# Patient Record
Sex: MALE | Race: WHITE | NOT HISPANIC OR LATINO | URBAN - METROPOLITAN AREA
[De-identification: names, ages, dates, MRNs, and addresses within clinical notes are randomized per-mention and may not be internally consistent; named-entity substitution may affect disease eponyms.]

---

## 2023-05-19 ENCOUNTER — OFFICE VISIT (OUTPATIENT)
Dept: PRIMARY CARE | Facility: CLINIC | Age: 83
End: 2023-05-19
Payer: COMMERCIAL

## 2023-05-19 VITALS
HEIGHT: 69 IN | DIASTOLIC BLOOD PRESSURE: 70 MMHG | BODY MASS INDEX: 21.42 KG/M2 | WEIGHT: 144.6 LBS | SYSTOLIC BLOOD PRESSURE: 134 MMHG

## 2023-05-19 DIAGNOSIS — Z00.00 ROUTINE GENERAL MEDICAL EXAMINATION AT A HEALTH CARE FACILITY: ICD-10-CM

## 2023-05-19 DIAGNOSIS — K52.9 GASTROENTERITIS: ICD-10-CM

## 2023-05-19 DIAGNOSIS — Z13.5 DIABETIC RETINOPATHY SCREENING: ICD-10-CM

## 2023-05-19 DIAGNOSIS — A49.9 BACTERIAL INFECTION: Primary | ICD-10-CM

## 2023-05-19 PROBLEM — M54.30 SCIATICA: Status: ACTIVE | Noted: 2023-05-19

## 2023-05-19 PROBLEM — M19.90 OSTEOARTHRITIS: Status: ACTIVE | Noted: 2023-05-19

## 2023-05-19 PROBLEM — E11.59 HYPERTENSION ASSOCIATED WITH DIABETES (MULTI): Status: ACTIVE | Noted: 2023-05-19

## 2023-05-19 PROBLEM — I15.2 HYPERTENSION ASSOCIATED WITH DIABETES (MULTI): Status: ACTIVE | Noted: 2023-05-19

## 2023-05-19 PROBLEM — E11.9 TYPE 2 DIABETES MELLITUS (MULTI): Status: ACTIVE | Noted: 2023-05-19

## 2023-05-19 PROBLEM — E03.9 HYPOTHYROIDISM, ADULT: Status: ACTIVE | Noted: 2023-05-19

## 2023-05-19 PROBLEM — R53.83 LACK OF STAMINA: Status: ACTIVE | Noted: 2023-05-19

## 2023-05-19 LAB — POC HEMOGLOBIN A1C: 8.2 % (ref 4.2–6.5)

## 2023-05-19 PROCEDURE — 99214 OFFICE O/P EST MOD 30 MIN: CPT | Performed by: EMERGENCY MEDICINE

## 2023-05-19 PROCEDURE — 1036F TOBACCO NON-USER: CPT | Performed by: EMERGENCY MEDICINE

## 2023-05-19 PROCEDURE — 3075F SYST BP GE 130 - 139MM HG: CPT | Performed by: EMERGENCY MEDICINE

## 2023-05-19 PROCEDURE — 1159F MED LIST DOCD IN RCRD: CPT | Performed by: EMERGENCY MEDICINE

## 2023-05-19 PROCEDURE — 83036 HEMOGLOBIN GLYCOSYLATED A1C: CPT | Performed by: EMERGENCY MEDICINE

## 2023-05-19 PROCEDURE — 1157F ADVNC CARE PLAN IN RCRD: CPT | Performed by: EMERGENCY MEDICINE

## 2023-05-19 PROCEDURE — 3078F DIAST BP <80 MM HG: CPT | Performed by: EMERGENCY MEDICINE

## 2023-05-19 RX ORDER — CYCLOBENZAPRINE HCL 10 MG
1 TABLET ORAL NIGHTLY
COMMUNITY
Start: 2022-08-15

## 2023-05-19 RX ORDER — METOPROLOL TARTRATE 25 MG/1
25 TABLET, FILM COATED ORAL
Qty: 90 TABLET | Refills: 1 | Status: SHIPPED | OUTPATIENT
Start: 2023-05-19 | End: 2023-08-18 | Stop reason: SDUPTHER

## 2023-05-19 RX ORDER — METRONIDAZOLE 500 MG/1
500 TABLET ORAL 2 TIMES DAILY
Qty: 14 TABLET | Refills: 0 | Status: SHIPPED | OUTPATIENT
Start: 2023-05-19 | End: 2023-05-26

## 2023-05-19 RX ORDER — LISINOPRIL 10 MG/1
1 TABLET ORAL DAILY
COMMUNITY
Start: 2012-12-03 | End: 2023-05-23 | Stop reason: SDUPTHER

## 2023-05-19 RX ORDER — METOPROLOL TARTRATE 25 MG/1
25 TABLET, FILM COATED ORAL
COMMUNITY
Start: 2012-12-03 | End: 2023-05-19 | Stop reason: SDUPTHER

## 2023-05-19 RX ORDER — LANCETS
EACH MISCELLANEOUS
COMMUNITY
Start: 2023-02-14

## 2023-05-19 RX ORDER — CIPROFLOXACIN 500 MG/1
500 TABLET ORAL 2 TIMES DAILY
Qty: 14 TABLET | Refills: 0 | Status: SHIPPED | OUTPATIENT
Start: 2023-05-19 | End: 2023-05-26

## 2023-05-19 RX ORDER — LEVOTHYROXINE SODIUM 25 UG/1
1 TABLET ORAL DAILY
COMMUNITY
Start: 2014-11-06 | End: 2024-03-05 | Stop reason: SDUPTHER

## 2023-05-19 RX ORDER — METFORMIN HYDROCHLORIDE 1000 MG/1
1 TABLET ORAL EVERY 12 HOURS
COMMUNITY
Start: 2012-12-03 | End: 2024-03-05 | Stop reason: SDUPTHER

## 2023-05-19 RX ORDER — LANCETS 30 GAUGE
EACH MISCELLANEOUS
COMMUNITY
Start: 2022-09-06

## 2023-05-19 RX ORDER — BLOOD SUGAR DIAGNOSTIC
STRIP MISCELLANEOUS
COMMUNITY
End: 2023-07-20

## 2023-05-23 DIAGNOSIS — E11.59 HYPERTENSION ASSOCIATED WITH DIABETES (MULTI): ICD-10-CM

## 2023-05-23 DIAGNOSIS — I15.2 HYPERTENSION ASSOCIATED WITH DIABETES (MULTI): ICD-10-CM

## 2023-05-24 RX ORDER — LISINOPRIL 10 MG/1
10 TABLET ORAL DAILY
Qty: 90 TABLET | Refills: 1 | Status: SHIPPED | OUTPATIENT
Start: 2023-05-24 | End: 2023-08-18 | Stop reason: SDUPTHER

## 2023-07-20 DIAGNOSIS — E11.69 TYPE 2 DIABETES MELLITUS WITH OTHER SPECIFIED COMPLICATION, UNSPECIFIED WHETHER LONG TERM INSULIN USE (MULTI): ICD-10-CM

## 2023-07-20 RX ORDER — BLOOD SUGAR DIAGNOSTIC
STRIP MISCELLANEOUS
Qty: 100 STRIP | Refills: 3 | Status: SHIPPED | OUTPATIENT
Start: 2023-07-20

## 2023-07-23 DIAGNOSIS — E11.69 TYPE 2 DIABETES MELLITUS WITH OTHER SPECIFIED COMPLICATION, UNSPECIFIED WHETHER LONG TERM INSULIN USE (MULTI): ICD-10-CM

## 2023-07-26 RX ORDER — LANCETS 33 GAUGE
EACH MISCELLANEOUS
Qty: 100 EACH | Refills: 1 | Status: SHIPPED | OUTPATIENT
Start: 2023-07-26 | End: 2023-07-31 | Stop reason: SDUPTHER

## 2023-07-31 RX ORDER — BLOOD-GLUCOSE CONTROL, NORMAL
EACH MISCELLANEOUS
Qty: 100 EACH | Refills: 1 | Status: SHIPPED | OUTPATIENT
Start: 2023-07-31 | End: 2024-03-05 | Stop reason: SDUPTHER

## 2023-08-18 ENCOUNTER — OFFICE VISIT (OUTPATIENT)
Dept: PRIMARY CARE | Facility: CLINIC | Age: 83
End: 2023-08-18
Payer: COMMERCIAL

## 2023-08-18 VITALS
DIASTOLIC BLOOD PRESSURE: 96 MMHG | HEIGHT: 69 IN | SYSTOLIC BLOOD PRESSURE: 156 MMHG | BODY MASS INDEX: 28.17 KG/M2 | WEIGHT: 190.2 LBS

## 2023-08-18 DIAGNOSIS — I15.2 HYPERTENSION ASSOCIATED WITH DIABETES (MULTI): ICD-10-CM

## 2023-08-18 DIAGNOSIS — E05.90 HYPERTHYROIDISM: ICD-10-CM

## 2023-08-18 DIAGNOSIS — E11.59 HYPERTENSION ASSOCIATED WITH DIABETES (MULTI): ICD-10-CM

## 2023-08-18 DIAGNOSIS — Z00.00 ROUTINE GENERAL MEDICAL EXAMINATION AT A HEALTH CARE FACILITY: ICD-10-CM

## 2023-08-18 DIAGNOSIS — E11.69 TYPE 2 DIABETES MELLITUS WITH OTHER SPECIFIED COMPLICATION, UNSPECIFIED WHETHER LONG TERM INSULIN USE (MULTI): ICD-10-CM

## 2023-08-18 DIAGNOSIS — M19.90 OSTEOARTHRITIS, UNSPECIFIED OSTEOARTHRITIS TYPE, UNSPECIFIED SITE: ICD-10-CM

## 2023-08-18 DIAGNOSIS — I15.9 SECONDARY HYPERTENSION: ICD-10-CM

## 2023-08-18 DIAGNOSIS — E03.9 HYPOTHYROIDISM, ADULT: Primary | ICD-10-CM

## 2023-08-18 LAB
NON-UH HIE A/G RATIO: 1
NON-UH HIE ALB: 3.6 G/DL (ref 3.4–5)
NON-UH HIE ALK PHOS: 84 UNIT/L (ref 46–116)
NON-UH HIE BASO COUNT: 0.04 X1000 (ref 0–0.2)
NON-UH HIE BASOS %: 0.6 %
NON-UH HIE BILIRUBIN, TOTAL: 0.7 MG/DL (ref 0.2–1)
NON-UH HIE BUN/CREAT RATIO: 19.2
NON-UH HIE BUN: 23 MG/DL (ref 9–23)
NON-UH HIE CALCIUM: 9.4 MG/DL (ref 8.7–10.4)
NON-UH HIE CALCULATED LDL CHOLESTEROL: 92 MG/DL (ref 60–130)
NON-UH HIE CALCULATED OSMOLALITY: 288 MOSM/KG (ref 275–295)
NON-UH HIE CHLORIDE: 104 MMOL/L (ref 98–107)
NON-UH HIE CHOLESTEROL: 159 MG/DL (ref 100–200)
NON-UH HIE CO2, VENOUS: 27 MMOL/L (ref 20–31)
NON-UH HIE CREATININE: 1.2 MG/DL (ref 0.6–1.1)
NON-UH HIE DIFF?: NO
NON-UH HIE EOS COUNT: 0.16 X1000 (ref 0–0.5)
NON-UH HIE EOSIN %: 2.4 %
NON-UH HIE GFR AA: >60
NON-UH HIE GLOBULIN: 3.5 G/DL
NON-UH HIE GLOMERULAR FILTRATION RATE: 58 ML/MIN/1.73M?
NON-UH HIE GLUCOSE: 190 MG/DL (ref 74–106)
NON-UH HIE GOT: 21 UNIT/L (ref 15–37)
NON-UH HIE GPT: 24 UNIT/L (ref 10–49)
NON-UH HIE HCT: 45.5 % (ref 41–52)
NON-UH HIE HDL CHOLESTEROL: 38 MG/DL (ref 40–60)
NON-UH HIE HGB A1C: 7.6 %
NON-UH HIE HGB: 15.1 G/DL (ref 13.5–17.5)
NON-UH HIE INSTR WBC: 6.4
NON-UH HIE K: 4.5 MMOL/L (ref 3.5–5.1)
NON-UH HIE LYMPH %: 24 %
NON-UH HIE LYMPH COUNT: 1.53 X1000 (ref 1.2–4.8)
NON-UH HIE MCH: 29.7 PG (ref 27–34)
NON-UH HIE MCHC: 33.2 G/DL (ref 32–37)
NON-UH HIE MCV: 89.5 FL (ref 80–100)
NON-UH HIE MONO %: 11.1 %
NON-UH HIE MONO COUNT: 0.71 X1000 (ref 0.1–1)
NON-UH HIE MPV: 8.4 FL (ref 7.4–10.4)
NON-UH HIE NA: 140 MMOL/L (ref 135–145)
NON-UH HIE NEUTROPHIL %: 61.9 %
NON-UH HIE NEUTROPHIL COUNT (ANC): 3.95 X1000 (ref 1.4–8.8)
NON-UH HIE NUCLEATED RBC: 0 /100WBC
NON-UH HIE PLATELET: 171 X10 (ref 150–450)
NON-UH HIE RBC: 5.09 X10 (ref 4.7–6.1)
NON-UH HIE RDW: 13.9 % (ref 11.5–14.5)
NON-UH HIE TOTAL CHOL/HDL CHOL RATIO: 4.2
NON-UH HIE TOTAL PROTEIN: 7.1 G/DL (ref 5.7–8.2)
NON-UH HIE TRIGLYCERIDES: 147 MG/DL (ref 30–150)
NON-UH HIE TSH: 4.18 UIU/ML (ref 0.55–4.78)
NON-UH HIE WBC: 6.4 X10 (ref 4.5–11)

## 2023-08-18 PROCEDURE — 3077F SYST BP >= 140 MM HG: CPT | Performed by: EMERGENCY MEDICINE

## 2023-08-18 PROCEDURE — 1157F ADVNC CARE PLAN IN RCRD: CPT | Performed by: EMERGENCY MEDICINE

## 2023-08-18 PROCEDURE — 1036F TOBACCO NON-USER: CPT | Performed by: EMERGENCY MEDICINE

## 2023-08-18 PROCEDURE — 3080F DIAST BP >= 90 MM HG: CPT | Performed by: EMERGENCY MEDICINE

## 2023-08-18 PROCEDURE — 99214 OFFICE O/P EST MOD 30 MIN: CPT | Performed by: EMERGENCY MEDICINE

## 2023-08-18 PROCEDURE — 1159F MED LIST DOCD IN RCRD: CPT | Performed by: EMERGENCY MEDICINE

## 2023-08-18 RX ORDER — LISINOPRIL 10 MG/1
10 TABLET ORAL DAILY
Qty: 90 TABLET | Refills: 1 | Status: SHIPPED | OUTPATIENT
Start: 2023-08-18 | End: 2024-03-05 | Stop reason: SDUPTHER

## 2023-08-18 RX ORDER — AMLODIPINE BESYLATE 5 MG/1
5 TABLET ORAL DAILY
Qty: 90 TABLET | Refills: 1 | Status: SHIPPED | OUTPATIENT
Start: 2023-08-18

## 2023-08-18 RX ORDER — LEVOTHYROXINE SODIUM 25 UG/1
25 TABLET ORAL
Qty: 90 TABLET | Refills: 1 | Status: SHIPPED | OUTPATIENT
Start: 2023-08-18

## 2023-08-18 RX ORDER — LEVOTHYROXINE SODIUM 25 UG/1
25 TABLET ORAL
COMMUNITY
End: 2023-08-18 | Stop reason: SDUPTHER

## 2023-08-18 RX ORDER — METOPROLOL TARTRATE 25 MG/1
50 TABLET, FILM COATED ORAL
Qty: 180 TABLET | Refills: 1 | Status: SHIPPED | OUTPATIENT
Start: 2023-08-18 | End: 2024-03-05 | Stop reason: SDUPTHER

## 2023-08-18 RX ORDER — AMLODIPINE BESYLATE 5 MG/1
TABLET ORAL DAILY
COMMUNITY
End: 2023-08-18 | Stop reason: SDUPTHER

## 2023-08-18 NOTE — PROGRESS NOTES
Subjective   Patient ID: Teddy Todd is a 83 y.o. male who presents for Follow-up.    Assessment/Plan   Problem List Items Addressed This Visit    None  82-year-old here for  follow up, last Medicare wellness in February 23     Patient is unsure as to what medications he takes    Diarrhea-improved with discontinuation of metformin and addition of dietary fiber    left shoulder pain- improved with physical therapy      Diabetes- on metformin and glipizide. Recently elevated home sugar numbers, check A1c.      Hypertension-patient unsure of what he takes.  His paperwork from [facility] is different medications than our computer .  Control is suboptimal.  Counseled the patient to call us with his accurate medication list and bring it with him next time    hyperlipidemia -simvastatin. Check lipid panel     Hypothyroidism- levothyroxine. Check TSH     Lab work    Source of history: Nurse, Medical personnel, Medical record, Patient.  History limitation: None.    HPI    No Known Allergies  This is a 82-year-old man here for  follow up     Recently went to emergency room for URI-like symptoms.  Test were negative.  He was discharged home on some over-the-counter medications.  Improved        Past medical history includes T2 DM, metabolic syndrome, hypertension, hyperlipidemia, hypothyroidism, hearing impairment, UTI, back pain with left lower extremity radiculopathy, and bilateral shoulder discomfort.     Social history  Drinks 5-6 drinks a year. Denies smoking or drugs     Family history is noncontributory   Current Outpatient Medications   Medication Sig Dispense Refill    metFORMIN (Glucophage) 1,000 mg tablet Take 1 tablet (1,000 mg) by mouth every 12 hours.      cyclobenzaprine (Flexeril) 10 mg tablet Take 1 tablet (10 mg) by mouth once daily at bedtime.      glipiZIDE (GlucotroL) 5 mg tablet Take 1 tablet daily 90 tablet 1    lancets (OneTouch Delica Plus Lancet) 30 gauge misc Test once daily 100 each 1  "   levothyroxine (Synthroid, Levoxyl) 25 mcg tablet Take 1 tablet (25 mcg) by mouth once daily.      lisinopril 10 mg tablet Take 1 tablet (10 mg) by mouth once daily. 90 tablet 1    metoprolol tartrate (Lopressor) 25 mg tablet Take 1 tablet (25 mg) by mouth once daily. 90 tablet 1    OneTouch Ultra Test strip TEST ONCE DAILY 100 strip 3    OneTouch Ultra2 Meter misc TEST ONCE A DAY AS DIRECTED      OneTouch UltraSoft Lancets misc TEST BLOOD SUGAR ONCE A DAY       No current facility-administered medications for this visit.       Objective   Visit Vitals  BP (!) 156/96   Ht 1.753 m (5' 9\")   Wt 86.3 kg (190 lb 3.2 oz)   BMI 28.09 kg/m²   Smoking Status Never   BSA 2.05 m²     Physical Exam  Vital signs as per nursing/MA documentation  General appearance: Alert and in no acute distress  HEENT: Normal Inspection  Neck - Normal Inspection  Respiratory : No respiratory distress. Lungs are clear   Cardiovascular: heart rate normal. No gallop  Back - normal inspection  Skin inspection:Warm  Musculoskeletal : No deformities  Neuro : Limited exam. Baseline    Review of Systems   Comprehensive review of systems as allowed by patient condition and nursing input is negative    Results including lab work, imaging reports were reviewed and wherever possible, independently verified    No family history on file.  Social History     Socioeconomic History    Marital status:      Spouse name: Not on file    Number of children: Not on file    Years of education: Not on file    Highest education level: Not on file   Occupational History    Not on file   Tobacco Use    Smoking status: Never    Smokeless tobacco: Never   Substance and Sexual Activity    Alcohol use: Not Currently    Drug use: Not on file    Sexual activity: Not on file   Other Topics Concern    Not on file   Social History Narrative    Not on file     Social Determinants of Health     Financial Resource Strain: Not on file   Food Insecurity: Not on file "   Transportation Needs: Not on file   Physical Activity: Not on file   Stress: Not on file   Social Connections: Not on file   Intimate Partner Violence: Not on file   Housing Stability: Not on file     Past Medical History:   Diagnosis Date    Hyperlipidemia, unspecified 04/30/2021    Hyperlipidemia    Metabolic syndrome 03/06/2018    Dysmetabolic syndrome    Personal history of other diseases of the circulatory system     History of hypertension    Personal history of other endocrine, nutritional and metabolic disease 11/06/2014    History of hypothyroidism    Personal history of urinary (tract) infections 11/05/2013    Personal history of urinary tract infection    Type 2 diabetes mellitus without complications (CMS/[facility]) 08/15/2022    Type 2 diabetes mellitus     No past surgical history on file.    Charting was completed using voice recognition technology and may include unintended errors.

## 2023-08-22 DIAGNOSIS — E11.69 TYPE 2 DIABETES MELLITUS WITH OTHER SPECIFIED COMPLICATION, UNSPECIFIED WHETHER LONG TERM INSULIN USE (MULTI): ICD-10-CM

## 2023-08-22 DIAGNOSIS — E78.5 HYPERLIPIDEMIA, UNSPECIFIED HYPERLIPIDEMIA TYPE: ICD-10-CM

## 2023-08-23 RX ORDER — GLIPIZIDE 5 MG/1
TABLET ORAL
Qty: 90 TABLET | Refills: 1 | Status: SHIPPED | OUTPATIENT
Start: 2023-08-23 | End: 2024-02-08

## 2023-08-23 RX ORDER — SIMVASTATIN 20 MG/1
20 TABLET, FILM COATED ORAL DAILY
Qty: 90 TABLET | Refills: 1 | Status: SHIPPED | OUTPATIENT
Start: 2023-08-23 | End: 2024-02-08

## 2024-02-07 DIAGNOSIS — E78.5 HYPERLIPIDEMIA, UNSPECIFIED HYPERLIPIDEMIA TYPE: ICD-10-CM

## 2024-02-07 DIAGNOSIS — E11.69 TYPE 2 DIABETES MELLITUS WITH OTHER SPECIFIED COMPLICATION, UNSPECIFIED WHETHER LONG TERM INSULIN USE (MULTI): ICD-10-CM

## 2024-02-08 RX ORDER — GLIPIZIDE 5 MG/1
TABLET ORAL
Qty: 90 TABLET | Refills: 1 | Status: SHIPPED | OUTPATIENT
Start: 2024-02-08 | End: 2024-03-05 | Stop reason: SDUPTHER

## 2024-02-08 RX ORDER — SIMVASTATIN 20 MG/1
20 TABLET, FILM COATED ORAL DAILY
Qty: 90 TABLET | Refills: 1 | Status: SHIPPED | OUTPATIENT
Start: 2024-02-08

## 2024-03-05 DIAGNOSIS — E11.69 TYPE 2 DIABETES MELLITUS WITH OTHER SPECIFIED COMPLICATION, UNSPECIFIED WHETHER LONG TERM INSULIN USE (MULTI): ICD-10-CM

## 2024-03-05 DIAGNOSIS — I15.2 HYPERTENSION ASSOCIATED WITH DIABETES (MULTI): ICD-10-CM

## 2024-03-05 DIAGNOSIS — E11.59 HYPERTENSION ASSOCIATED WITH DIABETES (MULTI): ICD-10-CM

## 2024-03-05 DIAGNOSIS — Z00.00 ROUTINE GENERAL MEDICAL EXAMINATION AT A HEALTH CARE FACILITY: ICD-10-CM

## 2024-03-05 RX ORDER — METFORMIN HYDROCHLORIDE 1000 MG/1
1000 TABLET ORAL EVERY 12 HOURS
Qty: 180 TABLET | Refills: 1 | Status: SHIPPED | OUTPATIENT
Start: 2024-03-05

## 2024-03-05 RX ORDER — GLIPIZIDE 5 MG/1
5 TABLET ORAL DAILY
Qty: 90 TABLET | Refills: 1 | Status: SHIPPED | OUTPATIENT
Start: 2024-03-05 | End: 2024-04-24 | Stop reason: SDUPTHER

## 2024-03-05 RX ORDER — BLOOD-GLUCOSE CONTROL, NORMAL
EACH MISCELLANEOUS
Qty: 100 EACH | Refills: 1 | Status: SHIPPED | OUTPATIENT
Start: 2024-03-05

## 2024-03-05 RX ORDER — LISINOPRIL 10 MG/1
10 TABLET ORAL DAILY
Qty: 90 TABLET | Refills: 1 | Status: SHIPPED | OUTPATIENT
Start: 2024-03-05

## 2024-03-05 RX ORDER — LEVOTHYROXINE SODIUM 25 UG/1
25 TABLET ORAL DAILY
Qty: 90 TABLET | Refills: 1 | Status: SHIPPED | OUTPATIENT
Start: 2024-03-05

## 2024-03-05 RX ORDER — METOPROLOL TARTRATE 25 MG/1
50 TABLET, FILM COATED ORAL
Qty: 180 TABLET | Refills: 1 | Status: SHIPPED | OUTPATIENT
Start: 2024-03-05

## 2024-04-24 ENCOUNTER — LAB (OUTPATIENT)
Dept: LAB | Facility: LAB | Age: 84
End: 2024-04-24
Payer: COMMERCIAL

## 2024-04-24 ENCOUNTER — OFFICE VISIT (OUTPATIENT)
Dept: PRIMARY CARE | Facility: CLINIC | Age: 84
End: 2024-04-24
Payer: COMMERCIAL

## 2024-04-24 VITALS
OXYGEN SATURATION: 96 % | WEIGHT: 193.2 LBS | SYSTOLIC BLOOD PRESSURE: 167 MMHG | BODY MASS INDEX: 28.61 KG/M2 | HEART RATE: 67 BPM | DIASTOLIC BLOOD PRESSURE: 89 MMHG | HEIGHT: 69 IN

## 2024-04-24 DIAGNOSIS — E03.9 HYPOTHYROIDISM, UNSPECIFIED TYPE: ICD-10-CM

## 2024-04-24 DIAGNOSIS — E11.59 HYPERTENSION ASSOCIATED WITH DIABETES (MULTI): Primary | ICD-10-CM

## 2024-04-24 DIAGNOSIS — E13.69 OTHER SPECIFIED DIABETES MELLITUS WITH OTHER SPECIFIED COMPLICATION, UNSPECIFIED WHETHER LONG TERM INSULIN USE (MULTI): ICD-10-CM

## 2024-04-24 DIAGNOSIS — D64.9 ANEMIA, UNSPECIFIED TYPE: ICD-10-CM

## 2024-04-24 DIAGNOSIS — T14.8XXA MUSCLE STRAIN: ICD-10-CM

## 2024-04-24 DIAGNOSIS — E78.5 DYSLIPIDEMIA: ICD-10-CM

## 2024-04-24 DIAGNOSIS — Z00.00 ROUTINE GENERAL MEDICAL EXAMINATION AT A HEALTH CARE FACILITY: ICD-10-CM

## 2024-04-24 DIAGNOSIS — E11.69 TYPE 2 DIABETES MELLITUS WITH OTHER SPECIFIED COMPLICATION, UNSPECIFIED WHETHER LONG TERM INSULIN USE (MULTI): ICD-10-CM

## 2024-04-24 DIAGNOSIS — I15.2 HYPERTENSION ASSOCIATED WITH DIABETES (MULTI): Primary | ICD-10-CM

## 2024-04-24 DIAGNOSIS — E03.9 HYPOTHYROIDISM, ADULT: ICD-10-CM

## 2024-04-24 LAB
ALBUMIN SERPL BCP-MCNC: 4.5 G/DL (ref 3.4–5)
ALP SERPL-CCNC: 64 U/L (ref 33–136)
ALT SERPL W P-5'-P-CCNC: 21 U/L (ref 10–52)
ANION GAP SERPL CALC-SCNC: 12 MMOL/L (ref 10–20)
AST SERPL W P-5'-P-CCNC: 22 U/L (ref 9–39)
BASOPHILS # BLD AUTO: 0.08 X10*3/UL (ref 0–0.1)
BASOPHILS NFR BLD AUTO: 1.2 %
BILIRUB SERPL-MCNC: 0.6 MG/DL (ref 0–1.2)
BUN SERPL-MCNC: 16 MG/DL (ref 6–23)
CALCIUM SERPL-MCNC: 9.8 MG/DL (ref 8.6–10.6)
CHLORIDE SERPL-SCNC: 103 MMOL/L (ref 98–107)
CHOLEST SERPL-MCNC: 185 MG/DL (ref 0–199)
CHOLESTEROL/HDL RATIO: 4.6
CO2 SERPL-SCNC: 29 MMOL/L (ref 21–32)
CREAT SERPL-MCNC: 1.08 MG/DL (ref 0.5–1.3)
EGFRCR SERPLBLD CKD-EPI 2021: 68 ML/MIN/1.73M*2
EOSINOPHIL # BLD AUTO: 0.14 X10*3/UL (ref 0–0.4)
EOSINOPHIL NFR BLD AUTO: 2.2 %
ERYTHROCYTE [DISTWIDTH] IN BLOOD BY AUTOMATED COUNT: 13.2 % (ref 11.5–14.5)
GLUCOSE SERPL-MCNC: 133 MG/DL (ref 74–99)
HCT VFR BLD AUTO: 48.9 % (ref 41–52)
HDLC SERPL-MCNC: 40.5 MG/DL
HGB BLD-MCNC: 15.7 G/DL (ref 13.5–17.5)
IMM GRANULOCYTES # BLD AUTO: 0.04 X10*3/UL (ref 0–0.5)
IMM GRANULOCYTES NFR BLD AUTO: 0.6 % (ref 0–0.9)
LDLC SERPL CALC-MCNC: 97 MG/DL
LYMPHOCYTES # BLD AUTO: 2.32 X10*3/UL (ref 0.8–3)
LYMPHOCYTES NFR BLD AUTO: 35.6 %
MCH RBC QN AUTO: 29.3 PG (ref 26–34)
MCHC RBC AUTO-ENTMCNC: 32.1 G/DL (ref 32–36)
MCV RBC AUTO: 91 FL (ref 80–100)
MONOCYTES # BLD AUTO: 0.61 X10*3/UL (ref 0.05–0.8)
MONOCYTES NFR BLD AUTO: 9.4 %
NEUTROPHILS # BLD AUTO: 3.32 X10*3/UL (ref 1.6–5.5)
NEUTROPHILS NFR BLD AUTO: 51 %
NON HDL CHOLESTEROL: 145 MG/DL (ref 0–149)
NRBC BLD-RTO: 0 /100 WBCS (ref 0–0)
PLATELET # BLD AUTO: 199 X10*3/UL (ref 150–450)
POC HEMOGLOBIN A1C: 8.5 % (ref 4.2–6.5)
POTASSIUM SERPL-SCNC: 5.1 MMOL/L (ref 3.5–5.3)
PROT SERPL-MCNC: 7.5 G/DL (ref 6.4–8.2)
RBC # BLD AUTO: 5.35 X10*6/UL (ref 4.5–5.9)
SODIUM SERPL-SCNC: 139 MMOL/L (ref 136–145)
TRIGL SERPL-MCNC: 238 MG/DL (ref 0–149)
TSH SERPL-ACNC: 3.52 MIU/L (ref 0.44–3.98)
VLDL: 48 MG/DL (ref 0–40)
WBC # BLD AUTO: 6.5 X10*3/UL (ref 4.4–11.3)

## 2024-04-24 PROCEDURE — 1036F TOBACCO NON-USER: CPT | Performed by: EMERGENCY MEDICINE

## 2024-04-24 PROCEDURE — 85025 COMPLETE CBC W/AUTO DIFF WBC: CPT

## 2024-04-24 PROCEDURE — 99397 PER PM REEVAL EST PAT 65+ YR: CPT | Performed by: EMERGENCY MEDICINE

## 2024-04-24 PROCEDURE — 84443 ASSAY THYROID STIM HORMONE: CPT

## 2024-04-24 PROCEDURE — G0442 ANNUAL ALCOHOL SCREEN 15 MIN: HCPCS | Performed by: EMERGENCY MEDICINE

## 2024-04-24 PROCEDURE — 80061 LIPID PANEL: CPT

## 2024-04-24 PROCEDURE — 83036 HEMOGLOBIN GLYCOSYLATED A1C: CPT | Performed by: EMERGENCY MEDICINE

## 2024-04-24 PROCEDURE — 99213 OFFICE O/P EST LOW 20 MIN: CPT | Performed by: EMERGENCY MEDICINE

## 2024-04-24 PROCEDURE — 3079F DIAST BP 80-89 MM HG: CPT | Performed by: EMERGENCY MEDICINE

## 2024-04-24 PROCEDURE — 80053 COMPREHEN METABOLIC PANEL: CPT

## 2024-04-24 PROCEDURE — G0446 INTENS BEHAVE THER CARDIO DX: HCPCS | Performed by: EMERGENCY MEDICINE

## 2024-04-24 PROCEDURE — 36415 COLL VENOUS BLD VENIPUNCTURE: CPT

## 2024-04-24 PROCEDURE — G0439 PPPS, SUBSEQ VISIT: HCPCS | Performed by: EMERGENCY MEDICINE

## 2024-04-24 PROCEDURE — 1159F MED LIST DOCD IN RCRD: CPT | Performed by: EMERGENCY MEDICINE

## 2024-04-24 PROCEDURE — 1170F FXNL STATUS ASSESSED: CPT | Performed by: EMERGENCY MEDICINE

## 2024-04-24 PROCEDURE — 1157F ADVNC CARE PLAN IN RCRD: CPT | Performed by: EMERGENCY MEDICINE

## 2024-04-24 PROCEDURE — G0444 DEPRESSION SCREEN ANNUAL: HCPCS | Performed by: EMERGENCY MEDICINE

## 2024-04-24 PROCEDURE — 99497 ADVNCD CARE PLAN 30 MIN: CPT | Performed by: EMERGENCY MEDICINE

## 2024-04-24 PROCEDURE — 3077F SYST BP >= 140 MM HG: CPT | Performed by: EMERGENCY MEDICINE

## 2024-04-24 RX ORDER — CYCLOBENZAPRINE HCL 10 MG
10 TABLET ORAL NIGHTLY PRN
Qty: 30 TABLET | Refills: 0 | Status: SHIPPED | OUTPATIENT
Start: 2024-04-24

## 2024-04-24 RX ORDER — GLIPIZIDE 5 MG/1
5 TABLET ORAL DAILY
Qty: 90 TABLET | Refills: 1 | Status: SHIPPED | OUTPATIENT
Start: 2024-04-24

## 2024-04-24 RX ORDER — LISINOPRIL 20 MG/1
20 TABLET ORAL DAILY
Qty: 90 TABLET | Refills: 1 | Status: SHIPPED | OUTPATIENT
Start: 2024-04-24 | End: 2025-05-29

## 2024-04-24 ASSESSMENT — ACTIVITIES OF DAILY LIVING (ADL)
DRESSING: INDEPENDENT
BATHING: INDEPENDENT
TAKING_MEDICATION: INDEPENDENT
GROCERY_SHOPPING: INDEPENDENT
MANAGING_FINANCES: INDEPENDENT
DOING_HOUSEWORK: INDEPENDENT

## 2024-04-24 ASSESSMENT — PATIENT HEALTH QUESTIONNAIRE - PHQ9
SUM OF ALL RESPONSES TO PHQ9 QUESTIONS 1 AND 2: 0
1. LITTLE INTEREST OR PLEASURE IN DOING THINGS: NOT AT ALL
2. FEELING DOWN, DEPRESSED OR HOPELESS: NOT AT ALL

## 2024-04-24 NOTE — PROGRESS NOTES
"Subjective   Reason for Visit: Teddy Todd is an 84 y.o. male here for a Medicare Wellness visit.     Past Medical, Surgical, and Family History reviewed and updated in chart.    Reviewed all medications by prescribing practitioner or clinical pharmacist (such as prescriptions, OTCs, herbal therapies and supplements) and documented in the medical record.    HPI  84 year old male here for Medicare wellness, physical, and follow up visit   Presents for long term health maintenance and medication management      Pain in low back. Worse at nighttime. Able to function throughout day. Very bothersome at night.     Also not genital discomfort. States that he had been able to retract foreskin. Notes similar issue many years ago which prompted him to have circumcision. No issues urinating.     Past medical history includes T2 DM, metabolic syndrome, hypertension, hyperlipidemia, hypothyroidism, hearing impairment, UTI, back pain with left lower extremity radiculopathy, and bilateral shoulder discomfort.     Social history  Drinks 5-6 drinks a year. Denies smoking or drugs     Family history is noncontributory     Patient Care Team:  Spencer Figueredo MD as PCP - General (Internal Medicine)  Spencer Figueredo MD as PCP - [facility] PCP     Review of Systems  Comprehensive review of symptoms was not positive for any symptoms other than HPI.      Objective   Vitals:  /89   Pulse 67   Ht 1.753 m (5' 9\")   Wt 87.6 kg (193 lb 3.2 oz)   SpO2 96%   BMI 28.53 kg/m²       Physical Exam  General appearance: Alert and in no acute distress  HEENT: Normal Inspection.  Neck: Normal Inspectiopn  Respiratory: No respiratory distress.   Cardiovascular: heart rate normal. No gallop  Back: normal inspection  Skin inspection: Warm  Musculoskeletal: No deformities  Neuro: Grossly Intact  Psychiatric: Cooperative     Assessment/Plan   Problem List Items Addressed This Visit       Type 2 diabetes mellitus (Multi)    " Hypothyroidism, adult    Hypertension associated with diabetes (Multi) - Primary     Other Visit Diagnoses       Other specified diabetes mellitus with other specified complication, unspecified whether long term insulin use (Multi)        Relevant Orders    POCT glycosylated hemoglobin (Hb A1C) manually resulted (Completed)    Muscle strain              Flank pain- CT abdomen/pelvis  Flexeril prn at bedtime     Genital discomfort- referred to urology      Diabetes- on metformin and glipizide. A1c in office 8.5, will increase glipizide to 5 mg BID     Hypertension- BP elevated. Continue amlodipine 5 mg and metoprolol 50 mg daily, will increase lisinopril to 20 mg     left shoulder pain- improved with physical therapy      hyperlipidemia -simvastatin. Check lipid panel     Hypothyroidism- levothyroxine. Check TSH     Lab work    Follow up in 3 months or sooner as needed     PH Q-9 depression screening was completed by authorized employee of the practice and answer of the questionnaire were explained and discussed with the patient.    Face-to-face with discussion completed with this individual regarding their cardiovascular risk and behavioral therapies of nutritional choices, exercise and elimination of habits contradicting to the risk. We agreed on how they may be able to reduce their current cardiovascular risk.     Screening for alcohol use completed.      I discussed advanced care planning including the explanation and discussion of advanced directives. If patient does not have current up to date documents, examples and information provided on how to create both living will and power of . Patient was encouraged to work on completing these documents.  Information and advise was also provided on DO NOT RESUSCITATE and patient encouraged to consider this  Patient is not sure about DNR at this time.     Scribe Attestation  By signing my name below, I, Jina Call, [employer]   attest that this documentation has  been prepared under the direction and in the presence of Spencer Figueredo MD.

## 2024-05-13 ENCOUNTER — TELEPHONE (OUTPATIENT)
Dept: PRIMARY CARE | Facility: CLINIC | Age: 84
End: 2024-05-13
Payer: COMMERCIAL

## 2024-05-13 NOTE — TELEPHONE ENCOUNTER
----- Message from Spencer Figueredo MD sent at 5/13/2024  9:31 AM EDT -----  Can you let the patient know that his CT scan does not show any serious abnormalities.  He is significantly constipated.  He needs to take over-the-counter laxatives

## 2024-06-13 ENCOUNTER — OFFICE VISIT (OUTPATIENT)
Dept: PRIMARY CARE | Facility: CLINIC | Age: 84
End: 2024-06-13
Payer: COMMERCIAL

## 2024-06-13 VITALS
OXYGEN SATURATION: 97 % | TEMPERATURE: 98 F | HEIGHT: 69 IN | WEIGHT: 194 LBS | HEART RATE: 94 BPM | BODY MASS INDEX: 28.73 KG/M2 | DIASTOLIC BLOOD PRESSURE: 68 MMHG | SYSTOLIC BLOOD PRESSURE: 140 MMHG

## 2024-06-13 DIAGNOSIS — Z98.890 HX OF CIRCUMCISION: ICD-10-CM

## 2024-06-13 DIAGNOSIS — L28.2 PRURITIC RASH: ICD-10-CM

## 2024-06-13 DIAGNOSIS — L50.9 URTICARIA: Primary | ICD-10-CM

## 2024-06-13 DIAGNOSIS — T78.40XA ALLERGIC REACTION, INITIAL ENCOUNTER: ICD-10-CM

## 2024-06-13 PROCEDURE — 1036F TOBACCO NON-USER: CPT | Performed by: STUDENT IN AN ORGANIZED HEALTH CARE EDUCATION/TRAINING PROGRAM

## 2024-06-13 PROCEDURE — 99214 OFFICE O/P EST MOD 30 MIN: CPT | Performed by: STUDENT IN AN ORGANIZED HEALTH CARE EDUCATION/TRAINING PROGRAM

## 2024-06-13 PROCEDURE — 1157F ADVNC CARE PLAN IN RCRD: CPT | Performed by: STUDENT IN AN ORGANIZED HEALTH CARE EDUCATION/TRAINING PROGRAM

## 2024-06-13 PROCEDURE — 1160F RVW MEDS BY RX/DR IN RCRD: CPT | Performed by: STUDENT IN AN ORGANIZED HEALTH CARE EDUCATION/TRAINING PROGRAM

## 2024-06-13 PROCEDURE — 3078F DIAST BP <80 MM HG: CPT | Performed by: STUDENT IN AN ORGANIZED HEALTH CARE EDUCATION/TRAINING PROGRAM

## 2024-06-13 PROCEDURE — 1159F MED LIST DOCD IN RCRD: CPT | Performed by: STUDENT IN AN ORGANIZED HEALTH CARE EDUCATION/TRAINING PROGRAM

## 2024-06-13 PROCEDURE — 3077F SYST BP >= 140 MM HG: CPT | Performed by: STUDENT IN AN ORGANIZED HEALTH CARE EDUCATION/TRAINING PROGRAM

## 2024-06-13 RX ORDER — CETIRIZINE HYDROCHLORIDE 10 MG/1
10 TABLET ORAL DAILY
Qty: 30 TABLET | Refills: 2 | Status: SHIPPED | OUTPATIENT
Start: 2024-06-13 | End: 2024-09-11

## 2024-06-13 RX ORDER — CIPROFLOXACIN 500 MG/1
1 TABLET ORAL
COMMUNITY
Start: 2024-05-31 | End: 2024-06-13 | Stop reason: WASHOUT

## 2024-06-13 RX ORDER — METHYLPREDNISOLONE 4 MG/1
TABLET ORAL
Qty: 21 TABLET | Refills: 0 | Status: SHIPPED | OUTPATIENT
Start: 2024-06-13 | End: 2024-06-20

## 2024-06-13 RX ORDER — OXYCODONE AND ACETAMINOPHEN 5; 325 MG/1; MG/1
1 TABLET ORAL
COMMUNITY
Start: 2024-05-31

## 2024-06-13 NOTE — PROGRESS NOTES
"Subjective   Patient ID: Teddy Todd is a 84 y.o. male who presents for the following    Assessment/Plan   #Hives  #Urticaria  -Cipro added to medication allergy list     PLAN  -Medrol dose pack. Will not use higher dose steroids due to uncontrolled DM  -Advised to check BG levels at least 2 times daily   -Advised to call PCP if sugars worsen   -Zyrtec rx given  -Advised to use aloe vera on pruritic areas   -Return precautions given       HPI  84M presents for acute sick visit. He had a circumcision 2 week ago and was put on ciprofloxacin. He states that after taking cipro he developed hives over his body. Areas include L thigh, L abdomen, R chest wall. He stopped taking Cipro 3-4 days ago. Currently denies any fevers, chills, CP, SOB, lip swelling, tongue swelling.     Of note patient has a hx of DM and is typically hyperglycemic per patient. Is currently on metformin and glipizide BID. Does not check his BG at home.     Denies any other complaints or symptoms at this time.     Denies fevers, chills, weight loss, lightheadedness, dizziness, vision changes, sore throat, runny nose, CP, SOB, cough, palpitations, n/v/d, abd pain, black/bloody stools, arthralgias, mood disturbance, or new numbness/weakness/tingling in arms/legs/face.      Visit Vitals  /68   Pulse 94   Temp 36.7 °C (98 °F)   Ht 1.753 m (5' 9\")   Wt 88 kg (194 lb)   SpO2 97%   BMI 28.65 kg/m²   Smoking Status Never   BSA 2.07 m²     PHYSICAL EXAM   Physical Exam     Visit Vitals  /68   Pulse 94   Temp 36.7 °C (98 °F)   Ht 1.753 m (5' 9\")   Wt 88 kg (194 lb)   SpO2 97%   BMI 28.65 kg/m²   Smoking Status Never   BSA 2.07 m²        General: NAD. NCAT. Aox3   HEENT: PERRLA. EOMI. MMM.   Cardiovascular: RRR.   Respiratory: CTABL. No acute respiratory distress.   GI: Soft, NT abdomen. S/p circumcision.   MSK: ROM x 4. CTLS non-tender.   Extremities: No edema.   Skin: Pruritic urticaria L thigh, L abdomen, R chest wall.  Neuro: Aox3. Cranial " Nerves grossly intact. Motor/sensory wnl.   Psych: Mood wnl.       REVIEW OF SYSTEMS   ROS in HPI     No Known Allergies    Current Outpatient Medications   Medication Sig Dispense Refill    amLODIPine (Norvasc) 5 mg tablet Take 1 tablet (5 mg) by mouth once daily. 90 tablet 1    cyclobenzaprine (Flexeril) 10 mg tablet Take 1 tablet (10 mg) by mouth once daily at bedtime.      cyclobenzaprine (Flexeril) 10 mg tablet Take 1 tablet (10 mg) by mouth as needed at bedtime for muscle spasms (for pain). 30 tablet 0    glipiZIDE (Glucotrol) 5 mg tablet Take 1 tablet (5 mg) by mouth once daily. 90 tablet 1    lancets (OneTouch Delica Plus Lancet) 30 gauge misc Test once daily 100 each 1    levothyroxine (Synthroid, Levoxyl) 25 mcg tablet Take 1 tablet (25 mcg) by mouth once daily in the morning. Take before meals. 90 tablet 1    levothyroxine (Synthroid, Levoxyl) 25 mcg tablet Take 1 tablet (25 mcg) by mouth once daily. 90 tablet 1    lisinopril 10 mg tablet Take 1 tablet (10 mg) by mouth once daily. 90 tablet 1    lisinopril 20 mg tablet Take 1 tablet (20 mg) by mouth once daily. 90 tablet 1    metFORMIN (Glucophage) 1,000 mg tablet Take 1 tablet (1,000 mg) by mouth every 12 hours. 180 tablet 1    metoprolol tartrate (Lopressor) 25 mg tablet Take 2 tablets (50 mg) by mouth once daily. 180 tablet 1    OneTouch Ultra Test strip TEST ONCE DAILY 100 strip 3    OneTouch Ultra2 Meter misc TEST ONCE A DAY AS DIRECTED      OneTouch UltraSoft Lancets misc TEST BLOOD SUGAR ONCE A DAY      oxyCODONE-acetaminophen (Percocet) 5-325 mg tablet Take 1 tablet by mouth 3 times a day.      simvastatin (Zocor) 20 mg tablet TAKE 1 TABLET DAILY 90 tablet 1    ciprofloxacin (Cipro) 500 mg tablet Take 1 tablet (500 mg) by mouth every 12 hours.       No current facility-administered medications for this visit.       Objective     Lab on 04/24/2024   Component Date Value Ref Range Status    WBC 04/24/2024 6.5  4.4 - 11.3 x10*3/uL Final    nRBC  04/24/2024 0.0  0.0 - 0.0 /100 WBCs Final    RBC 04/24/2024 5.35  4.50 - 5.90 x10*6/uL Final    Hemoglobin 04/24/2024 15.7  13.5 - 17.5 g/dL Final    Hematocrit 04/24/2024 48.9  41.0 - 52.0 % Final    MCV 04/24/2024 91  80 - 100 fL Final    MCH 04/24/2024 29.3  26.0 - 34.0 pg Final    MCHC 04/24/2024 32.1  32.0 - 36.0 g/dL Final    RDW 04/24/2024 13.2  11.5 - 14.5 % Final    Platelets 04/24/2024 199  150 - 450 x10*3/uL Final    Neutrophils % 04/24/2024 51.0  40.0 - 80.0 % Final    Immature Granulocytes %, Automated 04/24/2024 0.6  0.0 - 0.9 % Final    Lymphocytes % 04/24/2024 35.6  13.0 - 44.0 % Final    Monocytes % 04/24/2024 9.4  2.0 - 10.0 % Final    Eosinophils % 04/24/2024 2.2  0.0 - 6.0 % Final    Basophils % 04/24/2024 1.2  0.0 - 2.0 % Final    Neutrophils Absolute 04/24/2024 3.32  1.60 - 5.50 x10*3/uL Final    Immature Granulocytes Absolute, Au* 04/24/2024 0.04  0.00 - 0.50 x10*3/uL Final    Lymphocytes Absolute 04/24/2024 2.32  0.80 - 3.00 x10*3/uL Final    Monocytes Absolute 04/24/2024 0.61  0.05 - 0.80 x10*3/uL Final    Eosinophils Absolute 04/24/2024 0.14  0.00 - 0.40 x10*3/uL Final    Basophils Absolute 04/24/2024 0.08  0.00 - 0.10 x10*3/uL Final    Glucose 04/24/2024 133 (H)  74 - 99 mg/dL Final    Sodium 04/24/2024 139  136 - 145 mmol/L Final    Potassium 04/24/2024 5.1  3.5 - 5.3 mmol/L Final    Chloride 04/24/2024 103  98 - 107 mmol/L Final    Bicarbonate 04/24/2024 29  21 - 32 mmol/L Final    Anion Gap 04/24/2024 12  10 - 20 mmol/L Final    Urea Nitrogen 04/24/2024 16  6 - 23 mg/dL Final    Creatinine 04/24/2024 1.08  0.50 - 1.30 mg/dL Final    eGFR 04/24/2024 68  >60 mL/min/1.73m*2 Final    Calcium 04/24/2024 9.8  8.6 - 10.6 mg/dL Final    Albumin 04/24/2024 4.5  3.4 - 5.0 g/dL Final    Alkaline Phosphatase 04/24/2024 64  33 - 136 U/L Final    Total Protein 04/24/2024 7.5  6.4 - 8.2 g/dL Final    AST 04/24/2024 22  9 - 39 U/L Final    Bilirubin, Total 04/24/2024 0.6  0.0 - 1.2 mg/dL Final    ALT  04/24/2024 21  10 - 52 U/L Final    Cholesterol 04/24/2024 185  0 - 199 mg/dL Final    HDL-Cholesterol 04/24/2024 40.5  mg/dL Final    Cholesterol/HDL Ratio 04/24/2024 4.6   Final    LDL Calculated 04/24/2024 97  <=99 mg/dL Final    VLDL 04/24/2024 48 (H)  0 - 40 mg/dL Final    Triglycerides 04/24/2024 238 (H)  0 - 149 mg/dL Final    Non HDL Cholesterol 04/24/2024 145  0 - 149 mg/dL Final    Thyroid Stimulating Hormone 04/24/2024 3.52  0.44 - 3.98 mIU/L Final   Office Visit on 04/24/2024   Component Date Value Ref Range Status    POC HEMOGLOBIN A1c 04/24/2024 8.5 (A)  4.2 - 6.5 % Final       Radiology: Reviewed imaging in powerchart.  No results found.    No family history on file.  Social History     Socioeconomic History    Marital status:      Spouse name: None    Number of children: None    Years of education: None    Highest education level: None   Occupational History    None   Tobacco Use    Smoking status: Never    Smokeless tobacco: Never   Substance and Sexual Activity    Alcohol use: Not Currently    Drug use: None    Sexual activity: None   Other Topics Concern    None   Social History Narrative    None     Social Determinants of Health     Financial Resource Strain: Not on file   Food Insecurity: Not on file   Transportation Needs: Not on file   Physical Activity: Not on file   Stress: Not on file   Social Connections: Not on file   Intimate Partner Violence: Not on file   Housing Stability: Not on file     Past Medical History:   Diagnosis Date    Hyperlipidemia, unspecified 04/30/2021    Hyperlipidemia    Metabolic syndrome 03/06/2018    Dysmetabolic syndrome    Personal history of other diseases of the circulatory system     History of hypertension    Personal history of other endocrine, nutritional and metabolic disease 11/06/2014    History of hypothyroidism    Personal history of urinary (tract) infections 11/05/2013    Personal history of urinary tract infection    Type 2 diabetes mellitus  without complications (Multi) 08/15/2022    Type 2 diabetes mellitus     No past surgical history on file.    Charting was completed using voice recognition technology and may include unintended errors.

## 2024-07-17 ENCOUNTER — OFFICE VISIT (OUTPATIENT)
Dept: PRIMARY CARE | Facility: CLINIC | Age: 84
End: 2024-07-17
Payer: COMMERCIAL

## 2024-07-17 VITALS
HEART RATE: 73 BPM | SYSTOLIC BLOOD PRESSURE: 130 MMHG | OXYGEN SATURATION: 95 % | HEIGHT: 69 IN | DIASTOLIC BLOOD PRESSURE: 80 MMHG | BODY MASS INDEX: 28.14 KG/M2 | WEIGHT: 190 LBS

## 2024-07-17 DIAGNOSIS — E05.90 HYPERTHYROIDISM: ICD-10-CM

## 2024-07-17 DIAGNOSIS — E11.59 HYPERTENSION ASSOCIATED WITH DIABETES (MULTI): ICD-10-CM

## 2024-07-17 DIAGNOSIS — E11.69 TYPE 2 DIABETES MELLITUS WITH OTHER SPECIFIED COMPLICATION, UNSPECIFIED WHETHER LONG TERM INSULIN USE (MULTI): ICD-10-CM

## 2024-07-17 DIAGNOSIS — M54.30 SCIATICA, UNSPECIFIED LATERALITY: ICD-10-CM

## 2024-07-17 DIAGNOSIS — I15.2 HYPERTENSION ASSOCIATED WITH DIABETES (MULTI): ICD-10-CM

## 2024-07-17 DIAGNOSIS — I15.9 SECONDARY HYPERTENSION: ICD-10-CM

## 2024-07-17 DIAGNOSIS — R35.0 FREQUENT URINATION: Primary | ICD-10-CM

## 2024-07-17 DIAGNOSIS — Z00.00 ROUTINE GENERAL MEDICAL EXAMINATION AT A HEALTH CARE FACILITY: ICD-10-CM

## 2024-07-17 LAB
NON-UH HIE APPEARANCE, U: CLEAR
NON-UH HIE BILIRUBIN, U: NEGATIVE
NON-UH HIE BLOOD, U: NEGATIVE
NON-UH HIE COLOR, U: YELLOW
NON-UH HIE GLUCOSE QUAL, U: ABNORMAL
NON-UH HIE KETONES, U: NEGATIVE
NON-UH HIE LEUKOCYTE ESTERASE, U: NEGATIVE
NON-UH HIE NITRITE, U: NEGATIVE
NON-UH HIE PH, U: 5 (ref 4.5–8)
NON-UH HIE PROTEIN, U: NEGATIVE
NON-UH HIE SPECIFIC GRAVITY, U: 1.02 (ref 1–1.03)
NON-UH HIE U MICRO: ABNORMAL
NON-UH HIE UROBILINOGEN QUAL, U: ABNORMAL

## 2024-07-17 PROCEDURE — 99214 OFFICE O/P EST MOD 30 MIN: CPT | Performed by: EMERGENCY MEDICINE

## 2024-07-17 PROCEDURE — 3079F DIAST BP 80-89 MM HG: CPT | Performed by: EMERGENCY MEDICINE

## 2024-07-17 PROCEDURE — 3075F SYST BP GE 130 - 139MM HG: CPT | Performed by: EMERGENCY MEDICINE

## 2024-07-17 PROCEDURE — 1036F TOBACCO NON-USER: CPT | Performed by: EMERGENCY MEDICINE

## 2024-07-17 PROCEDURE — 1157F ADVNC CARE PLAN IN RCRD: CPT | Performed by: EMERGENCY MEDICINE

## 2024-07-17 RX ORDER — LEVOTHYROXINE SODIUM 25 UG/1
25 TABLET ORAL
Qty: 90 TABLET | Refills: 1 | Status: SHIPPED | OUTPATIENT
Start: 2024-07-17

## 2024-07-17 RX ORDER — BLOOD-GLUCOSE CONTROL, NORMAL
EACH MISCELLANEOUS
Qty: 100 EACH | Refills: 3 | Status: SHIPPED | OUTPATIENT
Start: 2024-07-17

## 2024-07-17 RX ORDER — METOPROLOL TARTRATE 25 MG/1
50 TABLET, FILM COATED ORAL
Qty: 180 TABLET | Refills: 1 | Status: SHIPPED | OUTPATIENT
Start: 2024-07-17

## 2024-07-17 RX ORDER — METFORMIN HYDROCHLORIDE 1000 MG/1
1000 TABLET ORAL EVERY 12 HOURS
Qty: 180 TABLET | Refills: 1 | Status: SHIPPED | OUTPATIENT
Start: 2024-07-17

## 2024-07-17 RX ORDER — GLIPIZIDE 5 MG/1
5 TABLET ORAL 2 TIMES DAILY
Qty: 180 TABLET | Refills: 1 | Status: SHIPPED | OUTPATIENT
Start: 2024-07-17

## 2024-07-17 RX ORDER — LISINOPRIL 10 MG/1
10 TABLET ORAL DAILY
Qty: 90 TABLET | Refills: 1 | Status: SHIPPED | OUTPATIENT
Start: 2024-07-17

## 2024-07-17 RX ORDER — AMLODIPINE BESYLATE 5 MG/1
5 TABLET ORAL DAILY
Qty: 90 TABLET | Refills: 1 | Status: SHIPPED | OUTPATIENT
Start: 2024-07-17

## 2024-07-17 RX ORDER — BLOOD SUGAR DIAGNOSTIC
STRIP MISCELLANEOUS
Qty: 100 STRIP | Refills: 3 | Status: SHIPPED | OUTPATIENT
Start: 2024-07-17

## 2024-07-17 ASSESSMENT — ENCOUNTER SYMPTOMS: BACK PAIN: 1

## 2024-07-17 NOTE — PROGRESS NOTES
"Subjective   Reason for Visit: Teddy Todd is an 84 y.o. male here for a Follow-up visit    Back Pain      84 year old male here for follow up visit     Presents for long term health maintenance and medication management        Past medical history includes T2 DM, metabolic syndrome, hypertension, hyperlipidemia, hypothyroidism, hearing impairment, UTI, back pain with left lower extremity radiculopathy, and bilateral shoulder discomfort.     Social history  Drinks 5-6 drinks a year. Denies smoking or drugs     Family history is noncontributory     Patient Care Team:  Spencer Figueredo MD as PCP - General (Internal Medicine)  Spencer Figueredo MD as PCP - [facility] PCP     Review of Systems  Comprehensive review of symptoms was not positive for any symptoms other than HPI.      Objective   Vitals:  Ht 1.753 m (5' 9\")   Wt 86.2 kg (190 lb)   BMI 28.06 kg/m²       Physical Exam  General appearance: Alert and in no acute distress  HEENT: Normal Inspection.  Neck: Normal Inspectiopn  Respiratory: No respiratory distress.   Cardiovascular: heart rate normal. No gallop  Back: normal inspection  Skin inspection: Warm  Musculoskeletal: No deformities  Neuro: Grossly Intact  Psychiatric: Cooperative     Assessment/Plan   Problem List Items Addressed This Visit       Type 2 diabetes mellitus (Multi)    Hypertension associated with diabetes (Multi)     Other Visit Diagnoses       Routine general medical examination at a health care facility        Hyperthyroidism        Secondary hypertension                Last wellness - 4/24/24    Pain in low back - Significant back pain when moving around. Counseled patient on some physical therapy exercises. Prescribed flexeril and will continue to monitor.    Genital discomfort - Patient had genital discomfort. He had a circumcision operation about 4 months ago. After surgery patient had rashes over his body. Issues subsided with use of benadryl.    Speech " difficulty - Patient experienced a sudden onset of babbling and inability to express himself. We will perform urine analysis to check for possible UTI.     Diabetes- on metformin and glipizide.     Hypertension-  Continue amlodipine 5 mg and metoprolol 50 mg daily, and lisinopril 20 mg     left shoulder pain- improved with physical therapy      hyperlipidemia -simvastatin. Check lipid panel     Hypothyroidism- levothyroxine. Check TSH     Lab work    Follow up in 3 months or sooner as needed

## 2024-07-19 ENCOUNTER — TELEPHONE (OUTPATIENT)
Dept: PRIMARY CARE | Facility: CLINIC | Age: 84
End: 2024-07-19
Payer: COMMERCIAL

## 2024-07-19 DIAGNOSIS — T14.8XXA MUSCLE STRAIN: ICD-10-CM

## 2024-07-19 RX ORDER — CYCLOBENZAPRINE HCL 10 MG
10 TABLET ORAL NIGHTLY PRN
Qty: 30 TABLET | Refills: 0 | Status: SHIPPED | OUTPATIENT
Start: 2024-07-19

## 2024-07-19 NOTE — TELEPHONE ENCOUNTER
When patient was in on Wednesday, he was supposed to get a prescription for flexeril. Please send to [facility] [address]  Also wants to know his urine test results.

## 2024-07-22 ENCOUNTER — TELEPHONE (OUTPATIENT)
Dept: PRIMARY CARE | Facility: CLINIC | Age: 84
End: 2024-07-22
Payer: COMMERCIAL

## 2024-07-22 ENCOUNTER — DOCUMENTATION (OUTPATIENT)
Dept: PRIMARY CARE | Facility: CLINIC | Age: 84
End: 2024-07-22
Payer: COMMERCIAL

## 2024-07-22 DIAGNOSIS — T14.8XXA MUSCLE STRAIN: ICD-10-CM

## 2024-07-22 RX ORDER — CYCLOBENZAPRINE HCL 10 MG
10 TABLET ORAL NIGHTLY PRN
Qty: 30 TABLET | Refills: 1 | Status: SHIPPED | OUTPATIENT
Start: 2024-07-22

## 2024-09-20 ENCOUNTER — APPOINTMENT (OUTPATIENT)
Dept: PRIMARY CARE | Facility: CLINIC | Age: 84
End: 2024-09-20
Payer: COMMERCIAL

## 2024-09-20 VITALS
BODY MASS INDEX: 28.73 KG/M2 | DIASTOLIC BLOOD PRESSURE: 83 MMHG | SYSTOLIC BLOOD PRESSURE: 146 MMHG | HEIGHT: 69 IN | WEIGHT: 194 LBS

## 2024-09-20 DIAGNOSIS — R42 VERTIGO: Primary | ICD-10-CM

## 2024-09-20 DIAGNOSIS — I15.2 HYPERTENSION ASSOCIATED WITH DIABETES (MULTI): ICD-10-CM

## 2024-09-20 DIAGNOSIS — E11.59 HYPERTENSION ASSOCIATED WITH DIABETES (MULTI): ICD-10-CM

## 2024-09-20 DIAGNOSIS — E11.69 TYPE 2 DIABETES MELLITUS WITH OTHER SPECIFIED COMPLICATION, UNSPECIFIED WHETHER LONG TERM INSULIN USE (MULTI): ICD-10-CM

## 2024-09-20 DIAGNOSIS — E13.69 OTHER SPECIFIED DIABETES MELLITUS WITH OTHER SPECIFIED COMPLICATION, UNSPECIFIED WHETHER LONG TERM INSULIN USE (MULTI): ICD-10-CM

## 2024-09-20 DIAGNOSIS — I71.40 ABDOMINAL AORTIC ANEURYSM (AAA), UNSPECIFIED PART, UNSPECIFIED WHETHER RUPTURED (CMS-HCC): ICD-10-CM

## 2024-09-20 DIAGNOSIS — Z23 FLU VACCINE NEED: ICD-10-CM

## 2024-09-20 LAB
NON-UH HIE A/G RATIO: 1.1
NON-UH HIE ALB: 3.7 G/DL (ref 3.4–5)
NON-UH HIE ALK PHOS: 94 UNIT/L (ref 45–117)
NON-UH HIE BASO COUNT: 0.08 X1000 (ref 0–0.2)
NON-UH HIE BASOS %: 1.1 %
NON-UH HIE BILIRUBIN, TOTAL: 0.6 MG/DL (ref 0.3–1.2)
NON-UH HIE BUN/CREAT RATIO: 14.5
NON-UH HIE BUN: 16 MG/DL (ref 9–23)
NON-UH HIE CALCIUM: 9.7 MG/DL (ref 8.7–10.4)
NON-UH HIE CALCULATED LDL CHOLESTEROL: 96 MG/DL (ref 60–130)
NON-UH HIE CALCULATED OSMOLALITY: 283 MOSM/KG (ref 275–295)
NON-UH HIE CHLORIDE: 104 MMOL/L (ref 98–107)
NON-UH HIE CHOLESTEROL: 185 MG/DL (ref 100–200)
NON-UH HIE CO2, VENOUS: 27 MMOL/L (ref 20–31)
NON-UH HIE CREATININE: 1.1 MG/DL (ref 0.6–1.1)
NON-UH HIE DIFF?: NO
NON-UH HIE EOS COUNT: 0.17 X1000 (ref 0–0.5)
NON-UH HIE EOSIN %: 2.5 %
NON-UH HIE GFR AA: >60
NON-UH HIE GLOBULIN: 3.5 G/DL
NON-UH HIE GLOMERULAR FILTRATION RATE: >60 ML/MIN/1.73M?
NON-UH HIE GLUCOSE: 208 MG/DL (ref 74–106)
NON-UH HIE GOT: 19 UNIT/L (ref 15–37)
NON-UH HIE GPT: 24 UNIT/L (ref 10–49)
NON-UH HIE HCT: 44.4 % (ref 41–52)
NON-UH HIE HDL CHOLESTEROL: 37 MG/DL (ref 40–60)
NON-UH HIE HGB A1C: 7.6 %
NON-UH HIE HGB: 14.8 G/DL (ref 13.5–17.5)
NON-UH HIE INSTR WBC: 6.8
NON-UH HIE K: 4.4 MMOL/L (ref 3.5–5.1)
NON-UH HIE LYMPH %: 29.1 %
NON-UH HIE LYMPH COUNT: 1.98 X1000 (ref 1.2–4.8)
NON-UH HIE MCH: 30.3 PG (ref 27–34)
NON-UH HIE MCHC: 33.3 G/DL (ref 32–37)
NON-UH HIE MCV: 90.9 FL (ref 80–100)
NON-UH HIE MONO %: 10 %
NON-UH HIE MONO COUNT: 0.68 X1000 (ref 0.1–1)
NON-UH HIE MPV: 8.6 FL (ref 7.4–10.4)
NON-UH HIE NA: 138 MMOL/L (ref 135–145)
NON-UH HIE NEUTROPHIL %: 57.3 %
NON-UH HIE NEUTROPHIL COUNT (ANC): 3.9 X1000 (ref 1.4–8.8)
NON-UH HIE NUCLEATED RBC: 0 /100WBC
NON-UH HIE PLATELET: 156 X10 (ref 150–450)
NON-UH HIE RBC: 4.88 X10 (ref 4.7–6.1)
NON-UH HIE RDW: 14.3 % (ref 11.5–14.5)
NON-UH HIE TOTAL CHOL/HDL CHOL RATIO: 5
NON-UH HIE TOTAL PROTEIN: 7.2 G/DL (ref 5.7–8.2)
NON-UH HIE TRIGLYCERIDES: 261 MG/DL (ref 30–150)
NON-UH HIE TSH: 3.55 UIU/ML (ref 0.55–4.78)
NON-UH HIE WBC: 6.8 X10 (ref 4.5–11)
POC HEMOGLOBIN A1C: 8 % (ref 4.2–6.5)

## 2024-09-20 PROCEDURE — 1157F ADVNC CARE PLAN IN RCRD: CPT | Performed by: EMERGENCY MEDICINE

## 2024-09-20 PROCEDURE — 3079F DIAST BP 80-89 MM HG: CPT | Performed by: EMERGENCY MEDICINE

## 2024-09-20 PROCEDURE — 1123F ACP DISCUSS/DSCN MKR DOCD: CPT | Performed by: EMERGENCY MEDICINE

## 2024-09-20 PROCEDURE — 1036F TOBACCO NON-USER: CPT | Performed by: EMERGENCY MEDICINE

## 2024-09-20 PROCEDURE — 1159F MED LIST DOCD IN RCRD: CPT | Performed by: EMERGENCY MEDICINE

## 2024-09-20 PROCEDURE — 83036 HEMOGLOBIN GLYCOSYLATED A1C: CPT | Performed by: EMERGENCY MEDICINE

## 2024-09-20 PROCEDURE — 1158F ADVNC CARE PLAN TLK DOCD: CPT | Performed by: EMERGENCY MEDICINE

## 2024-09-20 PROCEDURE — 99214 OFFICE O/P EST MOD 30 MIN: CPT | Performed by: EMERGENCY MEDICINE

## 2024-09-20 PROCEDURE — 3077F SYST BP >= 140 MM HG: CPT | Performed by: EMERGENCY MEDICINE

## 2024-09-20 RX ORDER — MECLIZINE HYDROCHLORIDE 25 MG/1
25 TABLET ORAL 3 TIMES DAILY PRN
Qty: 30 TABLET | Refills: 1 | Status: SHIPPED | OUTPATIENT
Start: 2024-09-20 | End: 2025-09-20

## 2024-09-20 ASSESSMENT — PATIENT HEALTH QUESTIONNAIRE - PHQ9
2. FEELING DOWN, DEPRESSED OR HOPELESS: NOT AT ALL
1. LITTLE INTEREST OR PLEASURE IN DOING THINGS: NOT AT ALL
1. LITTLE INTEREST OR PLEASURE IN DOING THINGS: NOT AT ALL
2. FEELING DOWN, DEPRESSED OR HOPELESS: NOT AT ALL
SUM OF ALL RESPONSES TO PHQ9 QUESTIONS 1 AND 2: 0
SUM OF ALL RESPONSES TO PHQ9 QUESTIONS 1 AND 2: 0

## 2024-09-20 ASSESSMENT — ENCOUNTER SYMPTOMS: BACK PAIN: 1

## 2024-09-20 NOTE — PROGRESS NOTES
Subjective   Reason for Visit: Teddy Todd is an 84 y.o. male here for a Follow-up visit    Presents for long term health maintenance and medication management      Past medical history includes T2 DM, metabolic syndrome, hypertension, hyperlipidemia, hypothyroidism, hearing impairment, UTI, back pain with left lower extremity radiculopathy, and bilateral shoulder discomfort.     Social history  Drinks 5-6 drinks a year. Denies smoking or drugs     Family history is noncontributory     Patient Care Team:  Spencer Figueredo MD as PCP - General (Internal Medicine)  Spencer Figueredo MD as PCP - [facility] PCP     Review of Systems   Musculoskeletal:  Positive for back pain.     Comprehensive review of symptoms was not positive for any symptoms other than HPI.      Objective   Vitals:  There were no vitals taken for this visit.      Physical Exam  General appearance: Alert and in no acute distress  HEENT: Normal Inspection.  Neck: Normal Inspectiopn  Respiratory: No respiratory distress.   Cardiovascular: heart rate normal. No gallop  Back: normal inspection  Skin inspection: Warm  Musculoskeletal: No deformities  Neuro: Grossly Intact  Psychiatric: Cooperative     Assessment/Plan   Problem List Items Addressed This Visit    None  Visit Diagnoses       Other specified diabetes mellitus with other specified complication, unspecified whether long term insulin use (Multi)        Flu vaccine need            Patient presents for follow up visit    Last wellness - 4/24/24    Vertigo - Patient had some feeling of dizziness and falling forward. He had no loss of consciousness. We will prescribe meclizine to be taken as necessary. Patient states that he has wax buildup in his ears, we will perform ear cleaning in office today.    Fatigue - Patient complains of general tiredness upon minimal exertion. We will order labs     Pain in low back - Significant back pain when moving around. Counseled patient on  some physical therapy exercises. Prescribed flexeril and will continue to monitor.    Genital discomfort - Patient had genital discomfort. He had a circumcision operation about 4 months ago. After surgery patient had rashes over his body. Issues subsided with use of benadryl.    Speech difficulty - Patient experienced a sudden onset of babbling and inability to express himself. We will perform urine analysis to check for possible UTI.     Diabetes- on metformin and glipizide.     Hypertension-  Continue amlodipine 5 mg and metoprolol 50 mg daily, and lisinopril 20 mg     left shoulder pain- improved with physical therapy      hyperlipidemia -simvastatin. Check lipid panel     Hypothyroidism- levothyroxine. Check TSH     Blood work and urine analysis ordered    Follow up in 3 months or sooner as needed

## 2024-11-08 ENCOUNTER — OFFICE VISIT (OUTPATIENT)
Dept: PRIMARY CARE | Facility: CLINIC | Age: 84
End: 2024-11-08
Payer: COMMERCIAL

## 2024-11-08 VITALS
DIASTOLIC BLOOD PRESSURE: 76 MMHG | BODY MASS INDEX: 28.61 KG/M2 | WEIGHT: 193.2 LBS | SYSTOLIC BLOOD PRESSURE: 134 MMHG | HEART RATE: 63 BPM | HEIGHT: 69 IN | OXYGEN SATURATION: 96 %

## 2024-11-08 DIAGNOSIS — Z00.00 ROUTINE GENERAL MEDICAL EXAMINATION AT A HEALTH CARE FACILITY: ICD-10-CM

## 2024-11-08 DIAGNOSIS — R42 VERTIGO: ICD-10-CM

## 2024-11-08 DIAGNOSIS — M54.50 LOW BACK PAIN, UNSPECIFIED BACK PAIN LATERALITY, UNSPECIFIED CHRONICITY, UNSPECIFIED WHETHER SCIATICA PRESENT: ICD-10-CM

## 2024-11-08 DIAGNOSIS — G45.9 TIA (TRANSIENT ISCHEMIC ATTACK): Primary | ICD-10-CM

## 2024-11-08 DIAGNOSIS — I71.40 ABDOMINAL AORTIC ANEURYSM (AAA), UNSPECIFIED PART, UNSPECIFIED WHETHER RUPTURED (CMS-HCC): ICD-10-CM

## 2024-11-08 PROCEDURE — 3075F SYST BP GE 130 - 139MM HG: CPT | Performed by: EMERGENCY MEDICINE

## 2024-11-08 PROCEDURE — 99214 OFFICE O/P EST MOD 30 MIN: CPT | Performed by: EMERGENCY MEDICINE

## 2024-11-08 PROCEDURE — 1157F ADVNC CARE PLAN IN RCRD: CPT | Performed by: EMERGENCY MEDICINE

## 2024-11-08 PROCEDURE — 1036F TOBACCO NON-USER: CPT | Performed by: EMERGENCY MEDICINE

## 2024-11-08 PROCEDURE — 3078F DIAST BP <80 MM HG: CPT | Performed by: EMERGENCY MEDICINE

## 2024-11-08 PROCEDURE — 1159F MED LIST DOCD IN RCRD: CPT | Performed by: EMERGENCY MEDICINE

## 2024-11-08 ASSESSMENT — PATIENT HEALTH QUESTIONNAIRE - PHQ9
1. LITTLE INTEREST OR PLEASURE IN DOING THINGS: NOT AT ALL
2. FEELING DOWN, DEPRESSED OR HOPELESS: NOT AT ALL
SUM OF ALL RESPONSES TO PHQ9 QUESTIONS 1 AND 2: 0

## 2024-11-08 ASSESSMENT — ENCOUNTER SYMPTOMS: BACK PAIN: 1

## 2024-11-08 NOTE — PROGRESS NOTES
"Subjective   Reason for Visit: Teddy Todd is an 84 y.o. male here for a Follow-up visit    Presents for long term health maintenance and medication management      Past medical history includes T2 DM, metabolic syndrome, hypertension, hyperlipidemia, hypothyroidism, hearing impairment, UTI, back pain with left lower extremity radiculopathy, and bilateral shoulder discomfort.     Social history  Drinks 5-6 drinks a year. Denies smoking or drugs     Family history is noncontributory     Patient Care Team:  Spencer Figueredo MD as PCP - General (Internal Medicine)  Spencer Figueredo MD as PCP - [employer] PCP     Review of Systems   Musculoskeletal:  Positive for back pain.     Comprehensive review of symptoms was not positive for any symptoms other than HPI.      Objective   Vitals:  /76   Pulse 63   Ht 1.753 m (5' 9\")   Wt 87.6 kg (193 lb 3.2 oz)   SpO2 96%   BMI 28.53 kg/m²       Physical Exam  General appearance: Alert and in no acute distress  HEENT: Normal Inspection.  Neck: Normal Inspectiopn  Respiratory: No respiratory distress.   Cardiovascular: heart rate normal. No gallop  Back: normal inspection  Skin inspection: Warm  Musculoskeletal: No deformities  Neuro: Grossly Intact  Psychiatric: Cooperative     Assessment/Plan   Problem List Items Addressed This Visit    None  Visit Diagnoses       Routine general medical examination at a health care facility            Patient presents for follow up visit    Last wellness - 4/24/24    Possible TIA - Patient experienced a bout of dizziness, confusion, weakness, and inability to ambulate. We will order a carotid duplex and an echo and monitor.    Vertigo - Controlled on meclizine, symptoms have significantly declined.    Fatigue - Patient complains of general tiredness upon minimal exertion. We will order labs     Pain in low back - Significant back pain when moving around. Counseled patient on some physical therapy exercises. " Prescribed flexeril and will continue to monitor.    Genital discomfort - Patient had genital discomfort. He had a circumcision operation about 4 months ago. After surgery patient had rashes over his body. Issues subsided with use of benadryl.    Speech difficulty - Patient experienced a sudden onset of babbling and inability to express himself. We will perform urine analysis to check for possible UTI.     Diabetes- on metformin and glipizide.     Hypertension-  Continue amlodipine 5 mg and metoprolol 50 mg daily, and lisinopril 20 mg     left shoulder pain- improved with physical therapy      hyperlipidemia -simvastatin. Check lipid panel     Hypothyroidism- levothyroxine. Check TSH     Blood work and urine analysis ordered    Follow up in 3 months or sooner as needed

## 2024-11-11 ENCOUNTER — APPOINTMENT (OUTPATIENT)
Dept: PRIMARY CARE | Facility: CLINIC | Age: 84
End: 2024-11-11
Payer: COMMERCIAL

## 2024-11-18 ENCOUNTER — APPOINTMENT (OUTPATIENT)
Dept: PRIMARY CARE | Facility: CLINIC | Age: 84
End: 2024-11-18
Payer: COMMERCIAL

## 2024-11-18 VITALS
BODY MASS INDEX: 28.73 KG/M2 | DIASTOLIC BLOOD PRESSURE: 83 MMHG | HEIGHT: 69 IN | OXYGEN SATURATION: 96 % | SYSTOLIC BLOOD PRESSURE: 135 MMHG | HEART RATE: 77 BPM | WEIGHT: 194 LBS

## 2024-11-18 DIAGNOSIS — M54.30 SCIATICA, UNSPECIFIED LATERALITY: ICD-10-CM

## 2024-11-18 DIAGNOSIS — E05.90 HYPERTHYROIDISM: ICD-10-CM

## 2024-11-18 DIAGNOSIS — E11.69 TYPE 2 DIABETES MELLITUS WITH OTHER SPECIFIED COMPLICATION, UNSPECIFIED WHETHER LONG TERM INSULIN USE (MULTI): ICD-10-CM

## 2024-11-18 DIAGNOSIS — I71.40 ABDOMINAL AORTIC ANEURYSM (AAA), UNSPECIFIED PART, UNSPECIFIED WHETHER RUPTURED (CMS-HCC): ICD-10-CM

## 2024-11-18 DIAGNOSIS — Z78.9 TRANSITION OF CARE: Primary | ICD-10-CM

## 2024-11-18 PROCEDURE — 3075F SYST BP GE 130 - 139MM HG: CPT | Performed by: EMERGENCY MEDICINE

## 2024-11-18 PROCEDURE — 3079F DIAST BP 80-89 MM HG: CPT | Performed by: EMERGENCY MEDICINE

## 2024-11-18 PROCEDURE — 1157F ADVNC CARE PLAN IN RCRD: CPT | Performed by: EMERGENCY MEDICINE

## 2024-11-18 PROCEDURE — 1159F MED LIST DOCD IN RCRD: CPT | Performed by: EMERGENCY MEDICINE

## 2024-11-18 PROCEDURE — 1123F ACP DISCUSS/DSCN MKR DOCD: CPT | Performed by: EMERGENCY MEDICINE

## 2024-11-18 PROCEDURE — 1158F ADVNC CARE PLAN TLK DOCD: CPT | Performed by: EMERGENCY MEDICINE

## 2024-11-18 PROCEDURE — 99496 TRANSJ CARE MGMT HIGH F2F 7D: CPT | Performed by: EMERGENCY MEDICINE

## 2024-11-18 PROCEDURE — 1036F TOBACCO NON-USER: CPT | Performed by: EMERGENCY MEDICINE

## 2024-11-18 RX ORDER — LEVOTHYROXINE SODIUM 25 UG/1
25 TABLET ORAL
Qty: 90 TABLET | Refills: 1 | Status: SHIPPED | OUTPATIENT
Start: 2024-11-18

## 2024-11-18 RX ORDER — NAPROXEN SODIUM 220 MG/1
81 TABLET, FILM COATED ORAL
COMMUNITY
Start: 2024-11-12

## 2024-11-18 ASSESSMENT — ENCOUNTER SYMPTOMS: BACK PAIN: 1

## 2024-11-18 NOTE — PROGRESS NOTES
"Subjective   Reason for Visit: Teddy Todd is an 84 y.o. male here for transitional care management    Patient was discharged from [facility] on 11/12/24 and there was interactive contact by patient/family or facility staff on behalf of patient with me/office within 2 working days regarding patient's transition    Patient was recently admitted to the hospital.  Patient's history regarding the reason for hospital admission and the course in the hospital was reviewed with patinet and/or family.  Patient's medical records including lab work, radiology and other medical notes were reviewed.  His medication list prior to admission and discharge medication list are compared and updated.    Patient encounter was done face-to-face    History from hospitalization-  Admitted for numbness and change in motor function in left arm and face. MRI did not show stroke, carotid duplex was negative, echo was negative. Patient was referred to vestibular therapy and discharged.    Past medical history includes T2 DM, metabolic syndrome, hypertension, hyperlipidemia, hypothyroidism, hearing impairment, UTI, back pain with left lower extremity radiculopathy, and bilateral shoulder discomfort.     Social history  Drinks 5-6 drinks a year. Denies smoking or drugs     Family history is noncontributory     Patient Care Team:  Spencer Figueredo MD as PCP - General (Internal Medicine)  Spencer Figueredo MD as PCP - [employer] PCP     Review of Systems   Musculoskeletal:  Positive for back pain.     Comprehensive review of symptoms was not positive for any symptoms other than HPI.      Objective   Vitals:  /83   Pulse 77   Ht 1.753 m (5' 9\")   Wt 88 kg (194 lb)   SpO2 96%   BMI 28.65 kg/m²       Physical Exam  General appearance: Alert and in no acute distress  HEENT: Normal Inspection.  Neck: Normal Inspectiopn  Respiratory: No respiratory distress.   Cardiovascular: heart rate normal. No " gallop  Back: normal inspection  Skin inspection: Warm  Musculoskeletal: No deformities  Neuro: Grossly Intact  Psychiatric: Cooperative     Assessment/Plan   Problem List Items Addressed This Visit    None  Visit Diagnoses       Hyperthyroidism            Patient presents for transitional care management    Last wellness - 4/24/24    Possible TIA - Patient experiences bouts of dizziness, confusion, weakness, and inability to ambulate. He received a carotid duplex and echo in the hospital which were both negative.    Vertigo - Controlled on meclizine, symptoms have significantly declined.    Fatigue - Patient complains of general tiredness upon minimal exertion. We will order labs     Pain in low back - Controlled with physical therapy and flexeril.    Genital discomfort - Patient had genital discomfort. He had a circumcision operation about 4 months ago. After surgery patient had rashes over his body. Issues subsided with use of benadryl.     Diabetes- on metformin and glipizide.     Hypertension-  Continue amlodipine 5 mg and metoprolol 50 mg daily, and lisinopril 20 mg     left shoulder pain- improved with physical therapy      hyperlipidemia -simvastatin. Check lipid panel     Hypothyroidism- levothyroxine. Check TSH    Follow up in 3 months or sooner as needed

## 2025-01-06 DIAGNOSIS — R42 VERTIGO: ICD-10-CM

## 2025-01-06 RX ORDER — MECLIZINE HYDROCHLORIDE 25 MG/1
25 TABLET ORAL 3 TIMES DAILY PRN
Qty: 30 TABLET | Refills: 2 | Status: SHIPPED | OUTPATIENT
Start: 2025-01-06

## 2025-02-20 DIAGNOSIS — I15.2 HYPERTENSION ASSOCIATED WITH DIABETES (MULTI): ICD-10-CM

## 2025-02-20 DIAGNOSIS — Z00.00 ROUTINE GENERAL MEDICAL EXAMINATION AT A HEALTH CARE FACILITY: ICD-10-CM

## 2025-02-20 DIAGNOSIS — I15.9 SECONDARY HYPERTENSION: ICD-10-CM

## 2025-02-20 DIAGNOSIS — E11.59 HYPERTENSION ASSOCIATED WITH DIABETES (MULTI): ICD-10-CM

## 2025-02-20 DIAGNOSIS — E11.69 TYPE 2 DIABETES MELLITUS WITH OTHER SPECIFIED COMPLICATION, UNSPECIFIED WHETHER LONG TERM INSULIN USE (MULTI): ICD-10-CM

## 2025-02-21 RX ORDER — METOPROLOL TARTRATE 25 MG/1
50 TABLET, FILM COATED ORAL
Qty: 180 TABLET | Refills: 1 | Status: SHIPPED | OUTPATIENT
Start: 2025-02-21

## 2025-02-21 RX ORDER — AMLODIPINE BESYLATE 5 MG/1
5 TABLET ORAL DAILY
Qty: 90 TABLET | Refills: 1 | Status: SHIPPED | OUTPATIENT
Start: 2025-02-21

## 2025-02-21 RX ORDER — METFORMIN HYDROCHLORIDE 1000 MG/1
1000 TABLET ORAL EVERY 12 HOURS
Qty: 180 TABLET | Refills: 1 | Status: SHIPPED | OUTPATIENT
Start: 2025-02-21

## 2025-02-21 RX ORDER — LEVOTHYROXINE SODIUM 25 UG/1
25 TABLET ORAL DAILY
Qty: 90 TABLET | Refills: 1 | Status: SHIPPED | OUTPATIENT
Start: 2025-02-21

## 2025-02-21 RX ORDER — LISINOPRIL 10 MG/1
10 TABLET ORAL DAILY
Qty: 90 TABLET | Refills: 1 | Status: SHIPPED | OUTPATIENT
Start: 2025-02-21

## 2025-02-21 RX ORDER — GLIPIZIDE 5 MG/1
5 TABLET ORAL 2 TIMES DAILY
Qty: 180 TABLET | Refills: 1 | Status: SHIPPED | OUTPATIENT
Start: 2025-02-21

## 2025-04-30 ENCOUNTER — OFFICE VISIT (OUTPATIENT)
Dept: PRIMARY CARE | Facility: CLINIC | Age: 85
End: 2025-04-30
Payer: COMMERCIAL

## 2025-04-30 VITALS
BODY MASS INDEX: 29.47 KG/M2 | WEIGHT: 199 LBS | HEIGHT: 69 IN | HEART RATE: 87 BPM | SYSTOLIC BLOOD PRESSURE: 140 MMHG | DIASTOLIC BLOOD PRESSURE: 84 MMHG

## 2025-04-30 DIAGNOSIS — Z00.00 WELLNESS EXAMINATION: Primary | ICD-10-CM

## 2025-04-30 DIAGNOSIS — E11.59 HYPERTENSION ASSOCIATED WITH DIABETES: ICD-10-CM

## 2025-04-30 DIAGNOSIS — I71.40 ABDOMINAL AORTIC ANEURYSM (AAA), UNSPECIFIED PART, UNSPECIFIED WHETHER RUPTURED: ICD-10-CM

## 2025-04-30 DIAGNOSIS — E11.69 TYPE 2 DIABETES MELLITUS WITH OTHER SPECIFIED COMPLICATION, UNSPECIFIED WHETHER LONG TERM INSULIN USE (MULTI): ICD-10-CM

## 2025-04-30 DIAGNOSIS — E13.69 OTHER SPECIFIED DIABETES MELLITUS WITH OTHER SPECIFIED COMPLICATION, UNSPECIFIED WHETHER LONG TERM INSULIN USE (MULTI): ICD-10-CM

## 2025-04-30 DIAGNOSIS — M79.89 RIGHT LEG SWELLING: ICD-10-CM

## 2025-04-30 DIAGNOSIS — E03.9 HYPOTHYROIDISM, ADULT: ICD-10-CM

## 2025-04-30 DIAGNOSIS — I15.2 HYPERTENSION ASSOCIATED WITH DIABETES: ICD-10-CM

## 2025-04-30 PROCEDURE — G0439 PPPS, SUBSEQ VISIT: HCPCS | Performed by: EMERGENCY MEDICINE

## 2025-04-30 PROCEDURE — 1170F FXNL STATUS ASSESSED: CPT | Performed by: EMERGENCY MEDICINE

## 2025-04-30 PROCEDURE — 99213 OFFICE O/P EST LOW 20 MIN: CPT | Performed by: EMERGENCY MEDICINE

## 2025-04-30 PROCEDURE — 99497 ADVNCD CARE PLAN 30 MIN: CPT | Performed by: EMERGENCY MEDICINE

## 2025-04-30 PROCEDURE — 99397 PER PM REEVAL EST PAT 65+ YR: CPT | Performed by: EMERGENCY MEDICINE

## 2025-04-30 PROCEDURE — 1157F ADVNC CARE PLAN IN RCRD: CPT | Performed by: EMERGENCY MEDICINE

## 2025-04-30 PROCEDURE — 3077F SYST BP >= 140 MM HG: CPT | Performed by: EMERGENCY MEDICINE

## 2025-04-30 PROCEDURE — 3079F DIAST BP 80-89 MM HG: CPT | Performed by: EMERGENCY MEDICINE

## 2025-04-30 PROCEDURE — G0444 DEPRESSION SCREEN ANNUAL: HCPCS | Performed by: EMERGENCY MEDICINE

## 2025-04-30 PROCEDURE — G0442 ANNUAL ALCOHOL SCREEN 15 MIN: HCPCS | Performed by: EMERGENCY MEDICINE

## 2025-04-30 RX ORDER — FUROSEMIDE 20 MG/1
20 TABLET ORAL DAILY
Qty: 10 TABLET | Refills: 2 | Status: SHIPPED | OUTPATIENT
Start: 2025-04-30 | End: 2025-05-30

## 2025-04-30 ASSESSMENT — ACTIVITIES OF DAILY LIVING (ADL): DOING_HOUSEWORK: INDEPENDENT

## 2025-04-30 ASSESSMENT — PATIENT HEALTH QUESTIONNAIRE - PHQ9: 1. LITTLE INTEREST OR PLEASURE IN DOING THINGS: NOT AT ALL

## 2025-04-30 ASSESSMENT — ENCOUNTER SYMPTOMS: BACK PAIN: 1

## 2025-04-30 NOTE — PROGRESS NOTES
"Subjective   Reason for Visit: Teddy Todd is an 85 y.o. male     Right leg is swollen for past few days.  No pain    History from hospitalization-  Admitted for numbness and change in motor function in left arm and face. MRI did not show stroke, carotid duplex was negative, echo was negative. Patient was referred to vestibular therapy and discharged.    Past medical history includes T2 DM, metabolic syndrome, hypertension, hyperlipidemia, hypothyroidism, hearing impairment, UTI, back pain with left lower extremity radiculopathy, and bilateral shoulder discomfort.     Social history  Drinks 5-6 drinks a year. Denies smoking or drugs     Family history is noncontributory     Patient Care Team:  Spencer Figueredo MD as PCP - General (Internal Medicine)  Spencer Figueredo MD as PCP - [facility] PCP     Review of Systems   Musculoskeletal:  Positive for back pain.     Comprehensive review of symptoms was not positive for any symptoms other than HPI.      Objective   Vitals:  /84   Pulse 87   Ht 1.753 m (5' 9\")   Wt 90.3 kg (199 lb)   BMI 29.39 kg/m²       Physical Exam  General appearance: Alert and in no acute distress  HEENT: Normal Inspection.  Neck: Normal Inspectiopn  Respiratory: No respiratory distress.   Cardiovascular: heart rate normal. No gallop  Back: normal inspection  Skin inspection: Warm  Musculoskeletal: No deformities  Neuro: Grossly Intact  Psychiatric: Cooperative     Assessment/Plan   Problem List Items Addressed This Visit       Type 2 diabetes mellitus    Hypothyroidism, adult    Hypertension associated with diabetes    Abdominal aortic aneurysm (AAA), unspecified part, unspecified whether ruptured     Other Visit Diagnoses         Wellness examination    -  Primary      Other specified diabetes mellitus with other specified complication, unspecified whether long term insulin use (Multi)          Right leg swelling        Relevant Medications    furosemide (Lasix) " 20 mg tablet    Other Relevant Orders    Vascular US lower extremity venous duplex right        Patient presents for Medicare wellness, physical and office visit      Right leg swelling-will obtain venous duplex and treat him with Lasix   He was counseled about controlling the amount of salt and liquids he drinks    Possible TIA - Patient experiences bouts of dizziness, confusion, weakness, and inability to ambulate. He received a carotid duplex and echo in the hospital which were both negative.    Vertigo - Controlled on meclizine, symptoms have significantly declined.    Fatigue - Patient complains of general tiredness upon minimal exertion. We will order labs     Pain in low back - Controlled with physical therapy and flexeril.    Genital discomfort - Patient had genital discomfort. He had a circumcision operation about 4 months ago. After surgery patient had rashes over his body. Issues subsided with use of benadryl.     Diabetes- on metformin and glipizide.     Hypertension-  Continue amlodipine 5 mg and metoprolol 50 mg daily, and lisinopril 20 mg     left shoulder pain- improved with physical therapy      hyperlipidemia -simvastatin. Check lipid panel     Hypothyroidism- levothyroxine. Check TSH      PH Q-9 depression screening was completed by authorized employee of the practice for 5-10 minutes and  explained the questionnaire and discussed the answers with the patient.    Alcohol screening was completed for 5 to 10 minutes    I discussed advanced care planning for more than 16 minutes including the explanation and discussion of advanced directives. Information and advise was also provided on DO NOT RESUSCITATE and patient encouraged to consider this  Patient is not sure about DNR at this time.      Labs    Follow up in 3 months or sooner as needed

## 2025-05-01 ENCOUNTER — TELEPHONE (OUTPATIENT)
Dept: PRIMARY CARE | Facility: CLINIC | Age: 85
End: 2025-05-01
Payer: COMMERCIAL

## 2025-05-01 DIAGNOSIS — Z00.00 ROUTINE GENERAL MEDICAL EXAMINATION AT A HEALTH CARE FACILITY: ICD-10-CM

## 2025-05-01 DIAGNOSIS — D64.9 ANEMIA, UNSPECIFIED TYPE: ICD-10-CM

## 2025-05-01 DIAGNOSIS — R73.02 IGT (IMPAIRED GLUCOSE TOLERANCE): ICD-10-CM

## 2025-05-01 DIAGNOSIS — E03.9 HYPOTHYROIDISM, UNSPECIFIED TYPE: ICD-10-CM

## 2025-05-01 DIAGNOSIS — E78.5 DYSLIPIDEMIA: ICD-10-CM

## 2025-05-02 ENCOUNTER — OFFICE VISIT (OUTPATIENT)
Dept: PRIMARY CARE | Facility: CLINIC | Age: 85
End: 2025-05-02
Payer: COMMERCIAL

## 2025-05-02 VITALS
DIASTOLIC BLOOD PRESSURE: 78 MMHG | SYSTOLIC BLOOD PRESSURE: 141 MMHG | HEART RATE: 65 BPM | OXYGEN SATURATION: 98 % | BODY MASS INDEX: 28.76 KG/M2 | HEIGHT: 69 IN | WEIGHT: 194.2 LBS

## 2025-05-02 DIAGNOSIS — I82.4Y1 DEEP VEIN THROMBOSIS (DVT) OF PROXIMAL VEIN OF RIGHT LOWER EXTREMITY, UNSPECIFIED CHRONICITY: Primary | ICD-10-CM

## 2025-05-02 DIAGNOSIS — E11.59 HYPERTENSION ASSOCIATED WITH DIABETES: ICD-10-CM

## 2025-05-02 DIAGNOSIS — E03.9 HYPOTHYROIDISM, ADULT: ICD-10-CM

## 2025-05-02 DIAGNOSIS — M54.30 SCIATICA, UNSPECIFIED LATERALITY: ICD-10-CM

## 2025-05-02 DIAGNOSIS — E11.69 TYPE 2 DIABETES MELLITUS WITH OTHER SPECIFIED COMPLICATION, UNSPECIFIED WHETHER LONG TERM INSULIN USE (MULTI): ICD-10-CM

## 2025-05-02 DIAGNOSIS — I15.2 HYPERTENSION ASSOCIATED WITH DIABETES: ICD-10-CM

## 2025-05-02 LAB
ALBUMIN SERPL-MCNC: 4.4 G/DL (ref 3.6–5.1)
ALP SERPL-CCNC: 71 U/L (ref 35–144)
ALT SERPL-CCNC: 17 U/L (ref 9–46)
ANION GAP SERPL CALCULATED.4IONS-SCNC: 9 MMOL/L (CALC) (ref 7–17)
AST SERPL-CCNC: 17 U/L (ref 10–35)
BASOPHILS # BLD AUTO: 63 CELLS/UL (ref 0–200)
BASOPHILS NFR BLD AUTO: 1 %
BILIRUB SERPL-MCNC: 0.6 MG/DL (ref 0.2–1.2)
BUN SERPL-MCNC: 16 MG/DL (ref 7–25)
CALCIUM SERPL-MCNC: 9.7 MG/DL (ref 8.6–10.3)
CHLORIDE SERPL-SCNC: 102 MMOL/L (ref 98–110)
CHOLEST SERPL-MCNC: 206 MG/DL
CHOLEST/HDLC SERPL: 5.2 (CALC)
CO2 SERPL-SCNC: 27 MMOL/L (ref 20–32)
CREAT SERPL-MCNC: 1.02 MG/DL (ref 0.7–1.22)
EGFRCR SERPLBLD CKD-EPI 2021: 72 ML/MIN/1.73M2
EOSINOPHIL # BLD AUTO: 107 CELLS/UL (ref 15–500)
EOSINOPHIL NFR BLD AUTO: 1.7 %
ERYTHROCYTE [DISTWIDTH] IN BLOOD BY AUTOMATED COUNT: 12.8 % (ref 11–15)
EST. AVERAGE GLUCOSE BLD GHB EST-MCNC: 235 MG/DL
EST. AVERAGE GLUCOSE BLD GHB EST-SCNC: 13 MMOL/L
GLUCOSE SERPL-MCNC: 192 MG/DL (ref 65–99)
HBA1C MFR BLD: 9.8 %
HCT VFR BLD AUTO: 46.1 % (ref 38.5–50)
HDLC SERPL-MCNC: 40 MG/DL
HGB BLD-MCNC: 15.3 G/DL (ref 13.2–17.1)
LDLC SERPL CALC-MCNC: 125 MG/DL (CALC)
LYMPHOCYTES # BLD AUTO: 1934 CELLS/UL (ref 850–3900)
LYMPHOCYTES NFR BLD AUTO: 30.7 %
MCH RBC QN AUTO: 30.2 PG (ref 27–33)
MCHC RBC AUTO-ENTMCNC: 33.2 G/DL (ref 32–36)
MCV RBC AUTO: 90.9 FL (ref 80–100)
MONOCYTES # BLD AUTO: 580 CELLS/UL (ref 200–950)
MONOCYTES NFR BLD AUTO: 9.2 %
NEUTROPHILS # BLD AUTO: 3616 CELLS/UL (ref 1500–7800)
NEUTROPHILS NFR BLD AUTO: 57.4 %
NONHDLC SERPL-MCNC: 166 MG/DL (CALC)
PLATELET # BLD AUTO: 175 THOUSAND/UL (ref 140–400)
PMV BLD REES-ECKER: 10.2 FL (ref 7.5–12.5)
POTASSIUM SERPL-SCNC: 5.1 MMOL/L (ref 3.5–5.3)
PROT SERPL-MCNC: 7.3 G/DL (ref 6.1–8.1)
RBC # BLD AUTO: 5.07 MILLION/UL (ref 4.2–5.8)
SODIUM SERPL-SCNC: 138 MMOL/L (ref 135–146)
TRIGL SERPL-MCNC: 267 MG/DL
TSH SERPL-ACNC: 2.99 MIU/L (ref 0.4–4.5)
WBC # BLD AUTO: 6.3 THOUSAND/UL (ref 3.8–10.8)

## 2025-05-02 PROCEDURE — 1159F MED LIST DOCD IN RCRD: CPT | Performed by: EMERGENCY MEDICINE

## 2025-05-02 PROCEDURE — 1036F TOBACCO NON-USER: CPT | Performed by: EMERGENCY MEDICINE

## 2025-05-02 PROCEDURE — 3077F SYST BP >= 140 MM HG: CPT | Performed by: EMERGENCY MEDICINE

## 2025-05-02 PROCEDURE — 99214 OFFICE O/P EST MOD 30 MIN: CPT | Performed by: EMERGENCY MEDICINE

## 2025-05-02 PROCEDURE — 3078F DIAST BP <80 MM HG: CPT | Performed by: EMERGENCY MEDICINE

## 2025-05-02 PROCEDURE — 1157F ADVNC CARE PLAN IN RCRD: CPT | Performed by: EMERGENCY MEDICINE

## 2025-05-02 RX ORDER — APIXABAN 5 MG (74)
KIT ORAL
Qty: 74 TABLET | Refills: 0 | Status: SHIPPED | OUTPATIENT
Start: 2025-05-02

## 2025-05-02 ASSESSMENT — ENCOUNTER SYMPTOMS: BACK PAIN: 1

## 2025-05-02 NOTE — PROGRESS NOTES
"Subjective   Reason for Visit: Teddy Todd is an 85 y.o. male     Right leg is swollen for past few days.  No pain  Patient's venous duplex found DVT    History from hospitalization-  Admitted for numbness and change in motor function in left arm and face. MRI did not show stroke, carotid duplex was negative, echo was negative. Patient was referred to vestibular therapy and discharged.    Past medical history includes T2 DM, metabolic syndrome, hypertension, hyperlipidemia, hypothyroidism, hearing impairment, UTI, back pain with left lower extremity radiculopathy, and bilateral shoulder discomfort.     Social history  Drinks 5-6 drinks a year. Denies smoking or drugs     Family history is noncontributory     Patient Care Team:  Spencer Figueredo MD as PCP - General (Internal Medicine)  Spencer Figueredo MD as PCP - [employer] PCP     Review of Systems   Musculoskeletal:  Positive for back pain.     Comprehensive review of symptoms was not positive for any symptoms other than HPI.      Objective   Vitals:  /78   Pulse 65   Ht 1.753 m (5' 9\")   Wt 88.1 kg (194 lb 3.2 oz)   SpO2 98%   BMI 28.68 kg/m²       Physical Exam  General appearance: Alert and in no acute distress  HEENT: Normal Inspection.  Neck: Normal Inspectiopn  Respiratory: No respiratory distress.   Cardiovascular: heart rate normal. No gallop  Back: normal inspection  Skin inspection: Warm  Musculoskeletal: No deformities  Neuro: Grossly Intact  Psychiatric: Cooperative     Assessment/Plan   Problem List Items Addressed This Visit    None  Visit Diagnoses         Deep vein thrombosis (DVT) of proximal vein of right lower extremity, unspecified chronicity    -  Primary    Relevant Medications    apixaban (Eliquis DVT-PE Treat 30D Start) 5 mg (74 tabs) tablet        Patient presents for Medicare wellness, physical and office visit    Right leg DVT-patient will be started on Eliquis.  If his insurance does not cover it then " it will be switched to Coumadin    Poorly controlled diabetes-his A1c is 9.8.  He is compliant with the medicines but not compliant with the diet.  Counseled him extensively that he needs to avoid sugar and all starchy products    Possible TIA - Patient experiences bouts of dizziness, confusion, weakness, and inability to ambulate. He received a carotid duplex and echo in the hospital which were both negative.    Vertigo - Controlled on meclizine, symptoms have significantly declined.    Fatigue - Patient complains of general tiredness upon minimal exertion. We will order labs     Pain in low back - Controlled with physical therapy and flexeril.    Genital discomfort - Patient had genital discomfort. He had a circumcision operation about 4 months ago. After surgery patient had rashes over his body. Issues subsided with use of benadryl.     Hypertension-  Continue amlodipine 5 mg and metoprolol 50 mg daily, and lisinopril 20 mg     left shoulder pain- improved with physical therapy      hyperlipidemia -simvastatin. Check lipid panel     Hypothyroidism- levothyroxine. Check TSH      Labs    Follow up in 3 months or sooner as needed

## 2025-05-05 ENCOUNTER — APPOINTMENT (OUTPATIENT)
Dept: PRIMARY CARE | Facility: CLINIC | Age: 85
End: 2025-05-05
Payer: COMMERCIAL

## 2025-05-05 ENCOUNTER — TELEPHONE (OUTPATIENT)
Dept: PRIMARY CARE | Facility: CLINIC | Age: 85
End: 2025-05-05

## 2025-05-05 NOTE — TELEPHONE ENCOUNTER
Patient went to pharmacy on Saturday to  rx for eliquis and wasn't able to get a free trial from pharmacy. Patient isn't sure what needs to be done. He has gone 3 days without medication.  He stated we are to call [phone] to initiate free trial?

## 2025-05-05 NOTE — TELEPHONE ENCOUNTER
Notified patient that the free 30-day trial card is available in office, he can  at his convenience. He then takes the card to the pharmacy in order to receive the medication. Patient verbalized understanding.

## 2025-06-02 ENCOUNTER — TELEPHONE (OUTPATIENT)
Dept: PRIMARY CARE | Facility: CLINIC | Age: 85
End: 2025-06-02
Payer: COMMERCIAL

## 2025-06-02 DIAGNOSIS — I82.4Y1 DEEP VEIN THROMBOSIS (DVT) OF PROXIMAL VEIN OF RIGHT LOWER EXTREMITY, UNSPECIFIED CHRONICITY: ICD-10-CM

## 2025-06-02 NOTE — TELEPHONE ENCOUNTER
PT had a blood clot and states he was prescribed apixaban (Eliquis DVT-PE Treat 30D Start) 5 mg (74 tabs) tablet     He is almost done with this and should he stop or does he continue and is so he needs a refill.

## 2025-06-03 NOTE — TELEPHONE ENCOUNTER
LMTCB to let patient know about the coumadin prescription being sent to pharmacy and referral to the coumadin clinic.

## 2025-06-04 RX ORDER — WARFARIN SODIUM 5 MG/1
TABLET ORAL
Qty: 30 TABLET | Refills: 3 | Status: SHIPPED | OUTPATIENT
Start: 2025-06-04

## 2025-08-18 DIAGNOSIS — E11.69 TYPE 2 DIABETES MELLITUS WITH OTHER SPECIFIED COMPLICATION, UNSPECIFIED WHETHER LONG TERM INSULIN USE (MULTI): ICD-10-CM

## 2025-08-19 RX ORDER — BLOOD-GLUCOSE CONTROL, NORMAL
EACH MISCELLANEOUS
Qty: 100 EACH | Refills: 3 | Status: SHIPPED | OUTPATIENT
Start: 2025-08-19

## 2025-08-19 RX ORDER — BLOOD SUGAR DIAGNOSTIC
STRIP MISCELLANEOUS
Qty: 100 EACH | Refills: 3 | Status: SHIPPED | OUTPATIENT
Start: 2025-08-19

## 2025-08-28 DIAGNOSIS — I15.9 SECONDARY HYPERTENSION: ICD-10-CM

## 2025-08-28 DIAGNOSIS — I15.2 HYPERTENSION ASSOCIATED WITH DIABETES: ICD-10-CM

## 2025-08-28 DIAGNOSIS — E11.59 HYPERTENSION ASSOCIATED WITH DIABETES: ICD-10-CM

## 2025-08-28 DIAGNOSIS — E11.69 TYPE 2 DIABETES MELLITUS WITH OTHER SPECIFIED COMPLICATION, UNSPECIFIED WHETHER LONG TERM INSULIN USE (MULTI): ICD-10-CM

## 2025-08-28 DIAGNOSIS — Z00.00 ROUTINE GENERAL MEDICAL EXAMINATION AT A HEALTH CARE FACILITY: ICD-10-CM

## 2025-08-28 RX ORDER — LEVOTHYROXINE SODIUM 25 UG/1
25 TABLET ORAL DAILY
Qty: 90 TABLET | Refills: 1 | Status: SHIPPED | OUTPATIENT
Start: 2025-08-28

## 2025-08-28 RX ORDER — METFORMIN HYDROCHLORIDE 1000 MG/1
1000 TABLET ORAL EVERY 12 HOURS
Qty: 180 TABLET | Refills: 1 | Status: SHIPPED | OUTPATIENT
Start: 2025-08-28

## 2025-08-28 RX ORDER — METOPROLOL TARTRATE 25 MG/1
50 TABLET, FILM COATED ORAL DAILY
Qty: 180 TABLET | Refills: 1 | Status: SHIPPED | OUTPATIENT
Start: 2025-08-28

## 2025-08-28 RX ORDER — GLIPIZIDE 5 MG/1
5 TABLET ORAL 2 TIMES DAILY
Qty: 180 TABLET | Refills: 1 | Status: SHIPPED | OUTPATIENT
Start: 2025-08-28

## 2025-08-28 RX ORDER — AMLODIPINE BESYLATE 5 MG/1
5 TABLET ORAL DAILY
Qty: 90 TABLET | Refills: 1 | Status: SHIPPED | OUTPATIENT
Start: 2025-08-28

## 2025-08-28 RX ORDER — LISINOPRIL 10 MG/1
10 TABLET ORAL DAILY
Qty: 90 TABLET | Refills: 1 | Status: SHIPPED | OUTPATIENT
Start: 2025-08-28